# Patient Record
Sex: FEMALE | Race: WHITE | NOT HISPANIC OR LATINO | URBAN - METROPOLITAN AREA
[De-identification: names, ages, dates, MRNs, and addresses within clinical notes are randomized per-mention and may not be internally consistent; named-entity substitution may affect disease eponyms.]

---

## 2017-06-07 ENCOUNTER — EMERGENCY (EMERGENCY)
Facility: HOSPITAL | Age: 62
LOS: 0 days | Discharge: HOME | End: 2017-06-07

## 2017-06-07 DIAGNOSIS — I10 ESSENTIAL (PRIMARY) HYPERTENSION: ICD-10-CM

## 2017-06-07 DIAGNOSIS — K92.2 GASTROINTESTINAL HEMORRHAGE, UNSPECIFIED: ICD-10-CM

## 2017-06-07 DIAGNOSIS — E03.9 HYPOTHYROIDISM, UNSPECIFIED: ICD-10-CM

## 2017-06-28 DIAGNOSIS — R51 HEADACHE: ICD-10-CM

## 2017-06-28 DIAGNOSIS — E03.9 HYPOTHYROIDISM, UNSPECIFIED: ICD-10-CM

## 2017-06-28 DIAGNOSIS — I10 ESSENTIAL (PRIMARY) HYPERTENSION: ICD-10-CM

## 2017-06-28 DIAGNOSIS — E78.00 PURE HYPERCHOLESTEROLEMIA, UNSPECIFIED: ICD-10-CM

## 2018-06-05 ENCOUNTER — INPATIENT (INPATIENT)
Facility: HOSPITAL | Age: 63
LOS: 1 days | Discharge: HOME | End: 2018-06-07
Attending: INTERNAL MEDICINE | Admitting: INTERNAL MEDICINE

## 2018-06-05 VITALS
TEMPERATURE: 98 F | DIASTOLIC BLOOD PRESSURE: 61 MMHG | RESPIRATION RATE: 16 BRPM | SYSTOLIC BLOOD PRESSURE: 119 MMHG | HEART RATE: 120 BPM | OXYGEN SATURATION: 98 %

## 2018-06-05 DIAGNOSIS — Z98.890 OTHER SPECIFIED POSTPROCEDURAL STATES: Chronic | ICD-10-CM

## 2018-06-05 LAB
ALBUMIN SERPL ELPH-MCNC: 3.8 G/DL — SIGNIFICANT CHANGE UP (ref 3.5–5.2)
ALP SERPL-CCNC: 79 U/L — SIGNIFICANT CHANGE UP (ref 30–115)
ALT FLD-CCNC: 9 U/L — SIGNIFICANT CHANGE UP (ref 0–41)
ANION GAP SERPL CALC-SCNC: 14 MMOL/L — SIGNIFICANT CHANGE UP (ref 7–14)
ANION GAP SERPL CALC-SCNC: 17 MMOL/L — HIGH (ref 7–14)
APTT BLD: 28.8 SEC — SIGNIFICANT CHANGE UP (ref 27–39.2)
APTT BLD: 31.2 SEC — SIGNIFICANT CHANGE UP (ref 27–39.2)
AST SERPL-CCNC: 15 U/L — SIGNIFICANT CHANGE UP (ref 0–41)
BASOPHILS # BLD AUTO: 0.03 K/UL — SIGNIFICANT CHANGE UP (ref 0–0.2)
BASOPHILS NFR BLD AUTO: 0.2 % — SIGNIFICANT CHANGE UP (ref 0–1)
BILIRUB SERPL-MCNC: 0.2 MG/DL — SIGNIFICANT CHANGE UP (ref 0.2–1.2)
BUN SERPL-MCNC: 109 MG/DL — CRITICAL HIGH (ref 10–20)
BUN SERPL-MCNC: 99 MG/DL — CRITICAL HIGH (ref 10–20)
CALCIUM SERPL-MCNC: 8.3 MG/DL — LOW (ref 8.5–10.1)
CALCIUM SERPL-MCNC: 8.6 MG/DL — SIGNIFICANT CHANGE UP (ref 8.5–10.1)
CHLORIDE SERPL-SCNC: 105 MMOL/L — SIGNIFICANT CHANGE UP (ref 98–110)
CHLORIDE SERPL-SCNC: 111 MMOL/L — HIGH (ref 98–110)
CO2 SERPL-SCNC: 16 MMOL/L — LOW (ref 17–32)
CO2 SERPL-SCNC: 18 MMOL/L — SIGNIFICANT CHANGE UP (ref 17–32)
CREAT SERPL-MCNC: 2.1 MG/DL — HIGH (ref 0.7–1.5)
CREAT SERPL-MCNC: 2.5 MG/DL — HIGH (ref 0.7–1.5)
EOSINOPHIL # BLD AUTO: 0 K/UL — SIGNIFICANT CHANGE UP (ref 0–0.7)
EOSINOPHIL NFR BLD AUTO: 0 % — SIGNIFICANT CHANGE UP (ref 0–8)
GLUCOSE SERPL-MCNC: 103 MG/DL — HIGH (ref 70–99)
GLUCOSE SERPL-MCNC: 105 MG/DL — HIGH (ref 70–99)
HCT VFR BLD CALC: 24.6 % — LOW (ref 37–47)
HCT VFR BLD CALC: 28.4 % — LOW (ref 37–47)
HGB BLD-MCNC: 7.7 G/DL — LOW (ref 12–16)
HGB BLD-MCNC: 9 G/DL — LOW (ref 12–16)
IMM GRANULOCYTES NFR BLD AUTO: 0.7 % — HIGH (ref 0.1–0.3)
INR BLD: 1.15 RATIO — SIGNIFICANT CHANGE UP (ref 0.65–1.3)
INR BLD: 1.23 RATIO — SIGNIFICANT CHANGE UP (ref 0.65–1.3)
LACTATE SERPL-SCNC: 1.1 MMOL/L — SIGNIFICANT CHANGE UP (ref 0.5–2.2)
LYMPHOCYTES # BLD AUTO: 1.39 K/UL — SIGNIFICANT CHANGE UP (ref 1.2–3.4)
LYMPHOCYTES # BLD AUTO: 10.3 % — LOW (ref 20.5–51.1)
MCHC RBC-ENTMCNC: 29.3 PG — SIGNIFICANT CHANGE UP (ref 27–31)
MCHC RBC-ENTMCNC: 29.5 PG — SIGNIFICANT CHANGE UP (ref 27–31)
MCHC RBC-ENTMCNC: 31.3 G/DL — LOW (ref 32–37)
MCHC RBC-ENTMCNC: 31.7 G/DL — LOW (ref 32–37)
MCV RBC AUTO: 93.1 FL — SIGNIFICANT CHANGE UP (ref 81–99)
MCV RBC AUTO: 93.5 FL — SIGNIFICANT CHANGE UP (ref 81–99)
MONOCYTES # BLD AUTO: 0.46 K/UL — SIGNIFICANT CHANGE UP (ref 0.1–0.6)
MONOCYTES NFR BLD AUTO: 3.4 % — SIGNIFICANT CHANGE UP (ref 1.7–9.3)
NEUTROPHILS # BLD AUTO: 11.54 K/UL — HIGH (ref 1.4–6.5)
NEUTROPHILS NFR BLD AUTO: 85.4 % — HIGH (ref 42.2–75.2)
NRBC # BLD: 0 /100 WBCS — SIGNIFICANT CHANGE UP (ref 0–0)
PLATELET # BLD AUTO: 263 K/UL — SIGNIFICANT CHANGE UP (ref 130–400)
PLATELET # BLD AUTO: 339 K/UL — SIGNIFICANT CHANGE UP (ref 130–400)
POTASSIUM SERPL-MCNC: 5.6 MMOL/L — HIGH (ref 3.5–5)
POTASSIUM SERPL-MCNC: 5.8 MMOL/L — HIGH (ref 3.5–5)
POTASSIUM SERPL-SCNC: 5.6 MMOL/L — HIGH (ref 3.5–5)
POTASSIUM SERPL-SCNC: 5.8 MMOL/L — HIGH (ref 3.5–5)
PROT SERPL-MCNC: 6.2 G/DL — SIGNIFICANT CHANGE UP (ref 6–8)
PROTHROM AB SERPL-ACNC: 12.5 SEC — SIGNIFICANT CHANGE UP (ref 9.95–12.87)
PROTHROM AB SERPL-ACNC: 13.3 SEC — HIGH (ref 9.95–12.87)
RBC # BLD: 2.63 M/UL — LOW (ref 4.2–5.4)
RBC # BLD: 3.05 M/UL — LOW (ref 4.2–5.4)
RBC # FLD: 13.1 % — SIGNIFICANT CHANGE UP (ref 11.5–14.5)
RBC # FLD: 13.2 % — SIGNIFICANT CHANGE UP (ref 11.5–14.5)
SODIUM SERPL-SCNC: 140 MMOL/L — SIGNIFICANT CHANGE UP (ref 135–146)
SODIUM SERPL-SCNC: 141 MMOL/L — SIGNIFICANT CHANGE UP (ref 135–146)
TROPONIN T SERPL-MCNC: <0.01 NG/ML — SIGNIFICANT CHANGE UP
TYPE + AB SCN PNL BLD: SIGNIFICANT CHANGE UP
WBC # BLD: 10.34 K/UL — SIGNIFICANT CHANGE UP (ref 4.8–10.8)
WBC # BLD: 13.51 K/UL — HIGH (ref 4.8–10.8)
WBC # FLD AUTO: 10.34 K/UL — SIGNIFICANT CHANGE UP (ref 4.8–10.8)
WBC # FLD AUTO: 13.51 K/UL — HIGH (ref 4.8–10.8)

## 2018-06-05 RX ORDER — AMLODIPINE BESYLATE 2.5 MG/1
0 TABLET ORAL
Qty: 90 | Refills: 0 | COMMUNITY

## 2018-06-05 RX ORDER — LEVOTHYROXINE SODIUM 125 MCG
200 TABLET ORAL DAILY
Qty: 0 | Refills: 0 | Status: DISCONTINUED | OUTPATIENT
Start: 2018-06-05 | End: 2018-06-07

## 2018-06-05 RX ORDER — SODIUM CHLORIDE 9 MG/ML
1000 INJECTION, SOLUTION INTRAVENOUS ONCE
Qty: 0 | Refills: 0 | Status: COMPLETED | OUTPATIENT
Start: 2018-06-05 | End: 2018-06-05

## 2018-06-05 RX ORDER — INSULIN HUMAN 100 [IU]/ML
10 INJECTION, SOLUTION SUBCUTANEOUS ONCE
Qty: 0 | Refills: 0 | Status: DISCONTINUED | OUTPATIENT
Start: 2018-06-05 | End: 2018-06-05

## 2018-06-05 RX ORDER — DEXTROSE 50 % IN WATER 50 %
50 SYRINGE (ML) INTRAVENOUS ONCE
Qty: 0 | Refills: 0 | Status: COMPLETED | OUTPATIENT
Start: 2018-06-05 | End: 2018-06-05

## 2018-06-05 RX ORDER — LEVOTHYROXINE SODIUM 125 MCG
0 TABLET ORAL
Qty: 90 | Refills: 0 | COMMUNITY

## 2018-06-05 RX ORDER — INSULIN HUMAN 100 [IU]/ML
10 INJECTION, SOLUTION SUBCUTANEOUS ONCE
Qty: 0 | Refills: 0 | Status: COMPLETED | OUTPATIENT
Start: 2018-06-05 | End: 2018-06-05

## 2018-06-05 RX ORDER — PANTOPRAZOLE SODIUM 20 MG/1
8 TABLET, DELAYED RELEASE ORAL
Qty: 80 | Refills: 0 | Status: DISCONTINUED | OUTPATIENT
Start: 2018-06-05 | End: 2018-06-06

## 2018-06-05 RX ORDER — SODIUM CHLORIDE 9 MG/ML
1000 INJECTION, SOLUTION INTRAVENOUS
Qty: 0 | Refills: 0 | Status: DISCONTINUED | OUTPATIENT
Start: 2018-06-05 | End: 2018-06-06

## 2018-06-05 RX ORDER — ROSUVASTATIN CALCIUM 5 MG/1
0 TABLET ORAL
Qty: 90 | Refills: 0 | COMMUNITY

## 2018-06-05 RX ORDER — SODIUM CHLORIDE 9 MG/ML
1000 INJECTION INTRAMUSCULAR; INTRAVENOUS; SUBCUTANEOUS
Qty: 0 | Refills: 0 | Status: DISCONTINUED | OUTPATIENT
Start: 2018-06-05 | End: 2018-06-05

## 2018-06-05 RX ORDER — IRBESARTAN 75 MG/1
0 TABLET ORAL
Qty: 90 | Refills: 0 | COMMUNITY

## 2018-06-05 RX ORDER — PANTOPRAZOLE SODIUM 20 MG/1
40 TABLET, DELAYED RELEASE ORAL ONCE
Qty: 0 | Refills: 0 | Status: COMPLETED | OUTPATIENT
Start: 2018-06-05 | End: 2018-06-05

## 2018-06-05 RX ORDER — ATORVASTATIN CALCIUM 80 MG/1
40 TABLET, FILM COATED ORAL AT BEDTIME
Qty: 0 | Refills: 0 | Status: DISCONTINUED | OUTPATIENT
Start: 2018-06-05 | End: 2018-06-07

## 2018-06-05 RX ADMIN — INSULIN HUMAN 10 UNIT(S): 100 INJECTION, SOLUTION SUBCUTANEOUS at 10:32

## 2018-06-05 RX ADMIN — Medication 50 MILLILITER(S): at 10:33

## 2018-06-05 RX ADMIN — PANTOPRAZOLE SODIUM 10 MG/HR: 20 TABLET, DELAYED RELEASE ORAL at 15:42

## 2018-06-05 RX ADMIN — SODIUM CHLORIDE 75 MILLILITER(S): 9 INJECTION INTRAMUSCULAR; INTRAVENOUS; SUBCUTANEOUS at 18:30

## 2018-06-05 RX ADMIN — Medication 50 MILLILITER(S): at 23:32

## 2018-06-05 RX ADMIN — ATORVASTATIN CALCIUM 40 MILLIGRAM(S): 80 TABLET, FILM COATED ORAL at 23:31

## 2018-06-05 RX ADMIN — PANTOPRAZOLE SODIUM 10 MG/HR: 20 TABLET, DELAYED RELEASE ORAL at 18:30

## 2018-06-05 RX ADMIN — PANTOPRAZOLE SODIUM 40 MILLIGRAM(S): 20 TABLET, DELAYED RELEASE ORAL at 09:35

## 2018-06-05 RX ADMIN — SODIUM CHLORIDE 2000 MILLILITER(S): 9 INJECTION, SOLUTION INTRAVENOUS at 07:24

## 2018-06-05 RX ADMIN — SODIUM CHLORIDE 75 MILLILITER(S): 9 INJECTION INTRAMUSCULAR; INTRAVENOUS; SUBCUTANEOUS at 15:42

## 2018-06-05 NOTE — ED ADULT NURSE NOTE - OBJECTIVE STATEMENT
Patient, a&o x4 c/o of bloody stools. Patient states she feels SOB, lethargic, and has had a hx of GI hemorrhage in the past

## 2018-06-05 NOTE — ED PROVIDER NOTE - PROGRESS NOTE DETAILS
Pale, tachy, h/o bleeding ulcers. Ordered labs, T&S, NS, pantoprazole. Signed off care to Dr. CINDY Osman at this time who will f/u. pt feeling improved, VS improved, normotensive, not tachycardic- d/w Dr. Mcgrath- requesting pt NPO, 2UPRBC on hold for endo, will take to endo upon arrival dw Dr. Vera, approved for ICU GI fellow at bedside

## 2018-06-05 NOTE — CONSULT NOTE ADULT - SUBJECTIVE AND OBJECTIVE BOX
Patient is a 62y old  Female who presents with a chief complaint of melena.    HPI: 62 year old female with PMH of HTN and PUD presenting with melena and BRBPR.  She became dizzy and diaphoretic upon walking to the bathroom this morning. After that, she had 2 episodes of melena mixed with blood. She uses NSAIDs occasionally. She had a similar episode few years ago and was diagnosed with PUD. She denies any abdominal pain, nausea, vomiting, chest pain, or palpitations.      PAST MEDICAL & SURGICAL HISTORY:  Hypothyroid  GI hemorrhage  High cholesterol  CKD (chronic kidney disease)  HTN (hypertension)  H/O colonoscopy      SOCIAL HX:   Smoking   neg                   ETOH neg                        Other neg    FAMILY HISTORY:  Family history of breast cancer in mother (Mother)      ROS:  See HPI     Allergies  No Known Allergies      PHYSICAL EXAM    ICU Vital Signs Last 24 Hrs  T(C): 37 (05 Jun 2018 10:24), Max: 37 (05 Jun 2018 10:24)  T(F): 98.6 (05 Jun 2018 10:24), Max: 98.6 (05 Jun 2018 10:24)  HR: 84 (05 Jun 2018 10:24) (75 - 120)  BP: 140/87 (05 Jun 2018 10:24) (114/74 - 140/87)  RR: 20 (05 Jun 2018 10:24) (16 - 20)  SpO2: 98% (05 Jun 2018 10:24) (98% - 98%)      General: NAD  HEENT:  JORGE              Lymph Nodes: No cervical LN   Lungs: Bilateral BS, no crackles, no wheezing  Cardiovascular: Regular, grade 3 systolic murmur  Abdomen: Soft, Positive BS, ND/NT  Extremities: No clubbing, no LLE  Skin: Warm  Neurological: Non focal         LABS:                          9.0    10.34 )-----------( 339      ( 05 Jun 2018 06:51 )             28.4                                               06-05    140  |  105  |  109<HH>  ----------------------------<  105<H>  5.6<H>   |  18  |  2.5<H>    Ca    8.6      05 Jun 2018 06:51  Phos  3.6     06-05  Mg     2.3     06-05    TPro  6.2  /  Alb  3.8  /  TBili  0.2  /  DBili  x   /  AST  15  /  ALT  9   /  AlkPhos  79  06-05      PT/INR - ( 05 Jun 2018 06:51 )   PT: 13.30 sec;   INR: 1.23 ratio         PTT - ( 05 Jun 2018 06:51 )  PTT:28.8 sec                                           CARDIAC MARKERS ( 05 Jun 2018 06:51 )  x     / <0.01 ng/mL / x     / x     / x                                                LIVER FUNCTIONS - ( 05 Jun 2018 06:51 )  Alb: 3.8 g/dL / Pro: 6.2 g/dL / ALK PHOS: 79 U/L / ALT: 9 U/L / AST: 15 U/L / GGT: x                                                                                                                                       X-Rays               unremarkable                                                         MEDICATIONS  (STANDING):  atorvastatin 40 milliGRAM(s) Oral at bedtime  levothyroxine 200 MICROGram(s) Oral daily

## 2018-06-05 NOTE — ED ADULT NURSE NOTE - PMH
CKD (chronic kidney disease)    GI hemorrhage    High cholesterol    HTN (hypertension)    Hypothyroid

## 2018-06-05 NOTE — CONSULT NOTE ADULT - SUBJECTIVE AND OBJECTIVE BOX
Chief Complaint:  Patient is a 62y old  Female who presents with a chief complaint of     HPI:    Allergies:  No Known Allergies      Home Medications:    Hospital Medications:      PMHX/PSHX:  Hypothyroid  GI hemorrhage  High cholesterol  CKD (chronic kidney disease)  HTN (hypertension)      Family history:      Social History:     ROS:     General:  No wt loss, fevers, chills, night sweats, fatigue,   Eyes:  Good vision, no reported pain  ENT:  No sore throat, pain, runny nose, dysphagia  CV:  No pain, palpitations, hypo/hypertension  Resp:  No dyspnea, cough, tachypnea, wheezing  GI:  No pain, No nausea, No vomiting, No diarrhea, No constipation, No weight loss, No fever, No pruritis, No rectal bleeding, No tarry stools, No dysphagia,  :  No pain, bleeding, incontinence, nocturia  Muscle:  No pain, weakness  Neuro:  No weakness, tingling, memory problems  Psych:  No fatigue, insomnia, mood problems, depression  Endocrine:  No polyuria, polydipsia, cold/heat intolerance  Heme:  No petechiae, ecchymosis, easy bruisability  Skin:  No rash, tattoos, scars, edema      PHYSICAL EXAM:   Vital Signs:  Vital Signs Last 24 Hrs  T(C): 36.2 (05 Jun 2018 07:20), Max: 36.7 (05 Jun 2018 06:25)  T(F): 97.1 (05 Jun 2018 07:20), Max: 98 (05 Jun 2018 06:25)  HR: 75 (05 Jun 2018 07:20) (75 - 120)  BP: 114/74 (05 Jun 2018 07:20) (114/74 - 119/61)  BP(mean): --  RR: 16 (05 Jun 2018 07:20) (16 - 16)  SpO2: 98% (05 Jun 2018 07:20) (98% - 98%)  Daily     Daily     GENERAL:  Appears stated age, well-groomed, well-nourished, no distress  HEENT:  NC/AT,  conjunctivae clear and pink, no thyromegaly, nodules, adenopathy, no JVD, sclera -anicteric  CHEST:  Full & symmetric excursion, no increased effort, breath sounds clear  HEART:  Regular rhythm, S1, S2, no murmur/rub/S3/S4, no abdominal bruit, no edema  ABDOMEN:  Soft, non-tender, non-distended, normoactive bowel sounds,  no masses ,no hepato-splenomegaly, no signs of chronic liver disease  EXTEREMITIES:  no cyanosis,clubbing or edema  SKIN:  No rash/erythema/ecchymoses/petechiae/wounds/abscess/warm/dry  NEURO:  Alert, oriented, no asterixis, no tremor, no encephalopathy    LABS:                        9.0    10.34 )-----------( 339      ( 05 Jun 2018 06:51 )             28.4     06-05    140  |  105  |  109<HH>  ----------------------------<  105<H>  5.6<H>   |  18  |  2.5<H>    Ca    8.6      05 Jun 2018 06:51  Phos  3.6     06-05  Mg     2.3     06-05    TPro  6.2  /  Alb  3.8  /  TBili  0.2  /  DBili  x   /  AST  15  /  ALT  9   /  AlkPhos  79  06-05    LIVER FUNCTIONS - ( 05 Jun 2018 06:51 )  Alb: 3.8 g/dL / Pro: 6.2 g/dL / ALK PHOS: 79 U/L / ALT: 9 U/L / AST: 15 U/L / GGT: x           PT/INR - ( 05 Jun 2018 06:51 )   PT: 13.30 sec;   INR: 1.23 ratio         PTT - ( 05 Jun 2018 06:51 )  PTT:28.8 sec        Imaging: Chief Complaint:  Patient is a 62y old  Female who presents with a chief complaint of melena    HPI:  62 year old female with PMH of hypothyroidism, HLD, HTN, history of GI bleed came presenting with melena mixed with blood. patient reports that she woke up this morning and went to the bathroom but started to become dizzy and diaphoretic , she had 2 episodes of melena mixed with blood. patient reports taking NSAIDs occasionally. She had a similar episode few years ago. She denies any abdominal pain, nausea, vomiting or hematemesis.    Allergies:  No Known Allergies      Hospital Medications:      PMHX/PSHX:  Hypothyroid  GI hemorrhage  High cholesterol  CKD (chronic kidney disease)  HTN (hypertension)    ROS:   Eyes:  Good vision, no reported pain  ENT:  No sore throat, pain, runny nose, dysphagia  CV:  No pain, palpitations, hypo/hypertension  Resp:  No dyspnea, cough, tachypnea, wheezing  GI: see H&P  :  No pain, bleeding, incontinence, nocturia  Muscle:  No pain, weakness  Neuro:  No weakness, tingling, memory problems  Psych:  No fatigue, insomnia, mood problems, depression  Endocrine:  No polyuria, polydipsia, cold/heat intolerance  Heme:  No petechiae, ecchymosis, easy bruisability  Skin:  No rash, tattoos, scars, edema      PHYSICAL EXAM:   Vital Signs:  Vital Signs Last 24 Hrs  T(C): 36.2 (05 Jun 2018 07:20), Max: 36.7 (05 Jun 2018 06:25)  T(F): 97.1 (05 Jun 2018 07:20), Max: 98 (05 Jun 2018 06:25)  HR: 75 (05 Jun 2018 07:20) (75 - 120)  BP: 114/74 (05 Jun 2018 07:20) (114/74 - 119/61)  BP(mean): --  RR: 16 (05 Jun 2018 07:20) (16 - 16)  SpO2: 98% (05 Jun 2018 07:20) (98% - 98%)  Daily     Daily     GENERAL:  Appears stated age, well-groomed, well-nourished, no distress  HEENT:  NC/AT,  conjunctivae clear and pink, no thyromegaly, nodules, adenopathy, no JVD, sclera -anicteric  CHEST:  Full & symmetric excursion, no increased effort, breath sounds clear  HEART:  Regular rhythm, S1, S2, no murmur/rub/S3/S4, no abdominal bruit, no edema  ABDOMEN:  Soft, non-tender, non-distended, normoactive bowel sounds,  no masses ,no hepato-splenomegaly,  EXTEREMITIES:  no cyanosis,clubbing or edema  SKIN:  No rash/erythema/ecchymoses/petechiae/wounds/abscess/warm/dry  NEURO:  Alert, oriented, no asterixis, no tremor, no encephalopathy    LABS:                        9.0    10.34 )-----------( 339      ( 05 Jun 2018 06:51 )             28.4     06-05    140  |  105  |  109<HH>  ----------------------------<  105<H>  5.6<H>   |  18  |  2.5<H>    Ca    8.6      05 Jun 2018 06:51  Phos  3.6     06-05  Mg     2.3     06-05    TPro  6.2  /  Alb  3.8  /  TBili  0.2  /  DBili  x   /  AST  15  /  ALT  9   /  AlkPhos  79  06-05    LIVER FUNCTIONS - ( 05 Jun 2018 06:51 )  Alb: 3.8 g/dL / Pro: 6.2 g/dL / ALK PHOS: 79 U/L / ALT: 9 U/L / AST: 15 U/L / GGT: x           PT/INR - ( 05 Jun 2018 06:51 )   PT: 13.30 sec;   INR: 1.23 ratio         PTT - ( 05 Jun 2018 06:51 )  PTT:28.8 sec        Imaging:

## 2018-06-05 NOTE — ED PROVIDER NOTE - OBJECTIVE STATEMENT
62yoF with h/o CKD, HTN, HLD, hypothyroidism, multiple stomach ulcers requiring multiple transfusions and ICU stay p/w bloody stools. States awoke this morning with urge to defecate, had 2 black stools with bright red blood in the toilet. Associated generalized weakness with any movement, SOB, paleness, lightheadedness, diaphoresis.

## 2018-06-05 NOTE — PRE-ANESTHESIA EVALUATION ADULT - NSANTHADDINFOFT_GEN_ALL_CORE
61 y/o WF evaluated for urgent EGD for ongoing GI hemorrhage with anemia.  ASA 3E.  Plan IV sedation / GA backup.  Plan, benefits, foreseeable risks, viable alternatives discussed with patient and all her pertinent questions answered and she understands and elects to proceed.

## 2018-06-05 NOTE — H&P ADULT - ASSESSMENT
62Y F with pmh of gi bleeding 2/2 gastric ulcers in 2014/2015, htn, hypothyroidism, dld and CKD presents to the ED after two episodes of melena.    Acute GI bleeding/melena likely 2/2 peptic ulcers vs duodenal ulcer   -Admit to ICU  -2 18 guage IV   -NPO for now  -2 units prbc on hold for endoscopy   -protonix drip   -monitor H&H  -Start NS at 75ml/hr    Hyperkalemia of 5.6  -one dose of insulin and dextrose given  -monitor bmp     HTN  -pt was normotensive in ED but had symptoms of hypotension at home so will hold BP meds for now     DLD  -c/w atorvastatin    BHAVANA on CKD vs stable CKD   -last available creatinine baseline of 1.5 in 2014  -will start NS @75 and hold irbesartan     hypothyroidism  -c/w synthroid    Dispo: Will advance diet after endoscopy as per recommendations. Pt is full code from home.

## 2018-06-05 NOTE — CONSULT NOTE ADULT - ASSESSMENT
IMPRESSION:  UGIB secondary to PUD  Acute on chronic anemia  CKD  hyperkalemia      PLAN:    CNS: no depressants    HEENT: Oral care    PULMONARY:  HOB @ 45 degrees    CARDIOVASCULAR: I = O    GI: protonix gtt. EGD, NPO    RENAL:  Follow up lytes.  Correct as needed    INFECTIOUS DISEASE: none    HEMATOLOGICAL:  type and screen, transfuse if HB < 7 or for symptomatic anemia, serial CBC, SCD    ENDOCRINE:  Follow up FS.      MUSCULOSKELETAL: OOB to chair  Monitor in ICU IMPRESSION:  UGIB secondary to PUD  Acute on chronic anemia  CKD        PLAN:    CNS: no depressants    HEENT: Oral care    PULMONARY:  HOB @ 45 degrees    CARDIOVASCULAR: I = O. D5 1/2 NS 50 cc per hour    GI: Protonix gtt. EGD, NPO    RENAL:  Follow up lytes 6 pm .  Correct as needed    INFECTIOUS DISEASE: none    HEMATOLOGICAL:  type and screen, transfuse if HB < 7 or for symptomatic anemia, serial CBC, SCD    ENDOCRINE:  Follow up FS.      MUSCULOSKELETAL:     Monitor in ICU    SP EGD visible vessel

## 2018-06-05 NOTE — ED PROVIDER NOTE - PHYSICAL EXAMINATION
Afebrile, tachy, hemodynamically stable, saturating well  NAD, pale  Head NCAT  EOMI grossly, pale conjunctivae and mucosa  MMM  Tachy, reg rhythm, nml S1/S2, no m/r/g  Lungs CTAB, no w/r/r  Abd soft, NT, ND, nml BS, no rebound or guarding  AAO, CN's 3-12 grossly intact  Rect (CHRIS Hall as chaperone): black stool, guaiac positive  LINARES spontaneously, no leg cyanosis or edema  Skin warm, well perfused, no rashes or hives Afebrile, tachy, hemodynamically stable, saturating well  NAD, pale  Head NCAT  EOMI grossly, pale conjunctivae and mucosa  MMM  Tachy, reg rhythm, nml S1/S2, no m/r/g  Lungs CTAB, no w/r/r  Abd soft, NT, ND, nml BS, no rebound or guarding  AAO, CN's 3-12 grossly intact  Rect (CHRIS Hall as chaperone): black stool, guaiac positive  LINARES spontaneously, no leg cyanosis or edema  Skin warm, pale, no rashes or hives

## 2018-06-05 NOTE — H&P ADULT - NSHPLABSRESULTS_GEN_ALL_CORE
9.0    10.34 )-----------( 339      ( 05 Jun 2018 06:51 )             28.4       06-05    140  |  105  |  109<HH>  ----------------------------<  105<H>  5.6<H>   |  18  |  2.5<H>    Ca    8.6      05 Jun 2018 06:51  Phos  3.6     06-05  Mg     2.3     06-05    TPro  6.2  /  Alb  3.8  /  TBili  0.2  /  DBili  x   /  AST  15  /  ALT  9   /  AlkPhos  79  06-05          PT/INR - ( 05 Jun 2018 06:51 )   PT: 13.30 sec;   INR: 1.23 ratio         PTT - ( 05 Jun 2018 06:51 )  PTT:28.8 sec    Lactate Trend  06-05 @ 06:51 Lactate:1.1       CARDIAC MARKERS ( 05 Jun 2018 06:51 )  x     / <0.01 ng/mL / x     / x     / x

## 2018-06-05 NOTE — ED PROVIDER NOTE - MEDICAL DECISION MAKING DETAILS
pt with sx improving but +melena and weakness,   D/W Dr. Mcgrath, D/W Dr. Lancaster, will admit to ICU

## 2018-06-05 NOTE — ED ADULT NURSE REASSESSMENT NOTE - NS ED NURSE REASSESS COMMENT FT1
Pt comfortable at this time. IV protonix requested from pharmacy. Vitals are stable, awaiting dispo, pt reports last bloody BM early this AM. IVF infusing, will cont to monitor and assess.

## 2018-06-05 NOTE — PRE-ANESTHESIA EVALUATION ADULT - NSANTHOSAYNRD_GEN_A_CORE
No. AKUA screening performed.  STOP BANG Legend: 0-2 = LOW Risk; 3-4 = INTERMEDIATE Risk; 5-8 = HIGH Risk

## 2018-06-05 NOTE — H&P ADULT - NSHPPHYSICALEXAM_GEN_ALL_CORE
T(F): 98.6 (06-05-18 @ 10:24), Max: 98.6 (06-05-18 @ 10:24)  HR: 84 (06-05-18 @ 10:24) (75 - 120)  BP: 140/87 (06-05-18 @ 10:24) (114/74 - 140/87)  RR: 20 (06-05-18 @ 10:24) (16 - 20)  SpO2: 98% (06-05-18 @ 10:24) (98% - 98%)  PHYSICAL EXAM:  GENERAL: NAD, speaks in full sentences, no signs of respiratory distress  HEAD:  Atraumatic, Normocephalic  EYES: EOMI, conjunctiva and sclera clear  CHEST/LUNG: Clear to auscultation bilaterally; No wheeze; No crackles; No accessory muscles used  HEART: Regular rate and rhythm; systolic murmur   ABDOMEN: Soft, Nontender, Nondistended; Bowel sounds present; No guarding  EXTREMITIES:  2+ Peripheral Pulses, No cyanosis or edema  PSYCH: AAOx3  NEUROLOGY: non-focal  SKIN: No rashes or lesions

## 2018-06-05 NOTE — H&P ADULT - HISTORY OF PRESENT ILLNESS
62Y F with pmh of gi bleeding 2/2 gastric ulcers in 2014/2015, htn, hypothyroidism, dld and CKD presents to the ED after two episodes of melena. As per patient she woke up at 4AM and had to go to the bathroom due to the urge to pass stool. She noticed that the stool was black mixed with some bright red blood. She had another episode at 5am after which she started feeling sob, lightheaded and sweaty and was brought to the ED by her . The patient has no abdominal pain but says she has been under a lot of stress lately. The last episode of GI bleeding occurred around the end of 2014 and the beginning of 2015. As per patient she had 9 bleeding ulcers and was on ppi for about 3 months after which it was stopped. In the ED she was tachycardic to 120s with a stable blood pressure. Her last colonoscopy was 2 yrs ago and her endoscopy was around the 2015. She had an intentional weight loss of 27 pounds. No other complaints.

## 2018-06-05 NOTE — H&P ADULT - NSHPSOCIALHISTORY_GEN_ALL_CORE
Marital Status:  (x )    (   ) Single    (   )    (  )   Occupation: retired  Lives with: (  ) alone  (  ) children   (x ) spouse   (  ) parents  (  ) other    Substance Use (street drugs): (x  ) never used  (  ) other:  Tobacco Usage:  (   ) never smoked   ( quit 25yrs ago) former smoker   (   ) current smoker  (     ) pack year  (        ) last cigarette date  Alcohol Usage: occasional

## 2018-06-05 NOTE — CONSULT NOTE ADULT - ASSESSMENT
62 year old female with PMH of hypothyroidism, HLD, HTN, history of GI bleed came presenting with melena mixed with blood.   Melena secondary to UGI bleed:  patient has history of gastric ulcers  Plan:  2 wide bore IV access  monitor H&H  transfuse appropriately  Protonix drip  NPO for now  will arrange for EGD today

## 2018-06-06 LAB
ALBUMIN SERPL ELPH-MCNC: 3.7 G/DL — SIGNIFICANT CHANGE UP (ref 3.5–5.2)
ALP SERPL-CCNC: 70 U/L — SIGNIFICANT CHANGE UP (ref 30–115)
ALT FLD-CCNC: 9 U/L — SIGNIFICANT CHANGE UP (ref 0–41)
ANION GAP SERPL CALC-SCNC: 12 MMOL/L — SIGNIFICANT CHANGE UP (ref 7–14)
ANION GAP SERPL CALC-SCNC: 13 MMOL/L — SIGNIFICANT CHANGE UP (ref 7–14)
APPEARANCE UR: CLEAR — SIGNIFICANT CHANGE UP
AST SERPL-CCNC: 13 U/L — SIGNIFICANT CHANGE UP (ref 0–41)
BACTERIA # UR AUTO: (no result) /HPF
BASOPHILS # BLD AUTO: 0.04 K/UL — SIGNIFICANT CHANGE UP (ref 0–0.2)
BASOPHILS NFR BLD AUTO: 0.3 % — SIGNIFICANT CHANGE UP (ref 0–1)
BILIRUB SERPL-MCNC: 0.5 MG/DL — SIGNIFICANT CHANGE UP (ref 0.2–1.2)
BILIRUB UR-MCNC: NEGATIVE — SIGNIFICANT CHANGE UP
BUN SERPL-MCNC: 80 MG/DL — CRITICAL HIGH (ref 10–20)
BUN SERPL-MCNC: 92 MG/DL — CRITICAL HIGH (ref 10–20)
CALCIUM SERPL-MCNC: 8.3 MG/DL — LOW (ref 8.5–10.1)
CALCIUM SERPL-MCNC: 8.6 MG/DL — SIGNIFICANT CHANGE UP (ref 8.5–10.1)
CHLORIDE SERPL-SCNC: 110 MMOL/L — SIGNIFICANT CHANGE UP (ref 98–110)
CHLORIDE SERPL-SCNC: 110 MMOL/L — SIGNIFICANT CHANGE UP (ref 98–110)
CO2 SERPL-SCNC: 17 MMOL/L — SIGNIFICANT CHANGE UP (ref 17–32)
CO2 SERPL-SCNC: 20 MMOL/L — SIGNIFICANT CHANGE UP (ref 17–32)
COLOR SPEC: YELLOW — SIGNIFICANT CHANGE UP
CREAT SERPL-MCNC: 2.1 MG/DL — HIGH (ref 0.7–1.5)
CREAT SERPL-MCNC: 2.1 MG/DL — HIGH (ref 0.7–1.5)
DIFF PNL FLD: NEGATIVE — SIGNIFICANT CHANGE UP
EOSINOPHIL # BLD AUTO: 0.09 K/UL — SIGNIFICANT CHANGE UP (ref 0–0.7)
EOSINOPHIL NFR BLD AUTO: 0.7 % — SIGNIFICANT CHANGE UP (ref 0–8)
EPI CELLS # UR: (no result) /HPF
GLUCOSE SERPL-MCNC: 118 MG/DL — HIGH (ref 70–99)
GLUCOSE SERPL-MCNC: 93 MG/DL — SIGNIFICANT CHANGE UP (ref 70–99)
GLUCOSE UR QL: NEGATIVE MG/DL — SIGNIFICANT CHANGE UP
HCT VFR BLD CALC: 22.2 % — LOW (ref 37–47)
HCT VFR BLD CALC: 25.9 % — LOW (ref 37–47)
HCT VFR BLD CALC: 27.2 % — LOW (ref 37–47)
HGB BLD-MCNC: 7 G/DL — CRITICAL LOW (ref 12–16)
HGB BLD-MCNC: 8.1 G/DL — LOW (ref 12–16)
HGB BLD-MCNC: 8.5 G/DL — LOW (ref 12–16)
IMM GRANULOCYTES NFR BLD AUTO: 0.6 % — HIGH (ref 0.1–0.3)
KETONES UR-MCNC: NEGATIVE — SIGNIFICANT CHANGE UP
LEUKOCYTE ESTERASE UR-ACNC: (no result)
LYMPHOCYTES # BLD AUTO: 15.4 % — LOW (ref 20.5–51.1)
LYMPHOCYTES # BLD AUTO: 2.05 K/UL — SIGNIFICANT CHANGE UP (ref 1.2–3.4)
MAGNESIUM SERPL-MCNC: 2.2 MG/DL — SIGNIFICANT CHANGE UP (ref 1.8–2.4)
MCHC RBC-ENTMCNC: 29.5 PG — SIGNIFICANT CHANGE UP (ref 27–31)
MCHC RBC-ENTMCNC: 29.7 PG — SIGNIFICANT CHANGE UP (ref 27–31)
MCHC RBC-ENTMCNC: 29.7 PG — SIGNIFICANT CHANGE UP (ref 27–31)
MCHC RBC-ENTMCNC: 31.3 G/DL — LOW (ref 32–37)
MCHC RBC-ENTMCNC: 31.3 G/DL — LOW (ref 32–37)
MCHC RBC-ENTMCNC: 31.5 G/DL — LOW (ref 32–37)
MCV RBC AUTO: 94.1 FL — SIGNIFICANT CHANGE UP (ref 81–99)
MCV RBC AUTO: 94.4 FL — SIGNIFICANT CHANGE UP (ref 81–99)
MCV RBC AUTO: 94.9 FL — SIGNIFICANT CHANGE UP (ref 81–99)
MONOCYTES # BLD AUTO: 0.88 K/UL — HIGH (ref 0.1–0.6)
MONOCYTES NFR BLD AUTO: 6.6 % — SIGNIFICANT CHANGE UP (ref 1.7–9.3)
NEUTROPHILS # BLD AUTO: 10.13 K/UL — HIGH (ref 1.4–6.5)
NEUTROPHILS NFR BLD AUTO: 76.4 % — HIGH (ref 42.2–75.2)
NITRITE UR-MCNC: NEGATIVE — SIGNIFICANT CHANGE UP
NRBC # BLD: 0 /100 WBCS — SIGNIFICANT CHANGE UP (ref 0–0)
PH UR: 6 — SIGNIFICANT CHANGE UP (ref 5–8)
PLATELET # BLD AUTO: 241 K/UL — SIGNIFICANT CHANGE UP (ref 130–400)
PLATELET # BLD AUTO: 251 K/UL — SIGNIFICANT CHANGE UP (ref 130–400)
PLATELET # BLD AUTO: 281 K/UL — SIGNIFICANT CHANGE UP (ref 130–400)
POTASSIUM SERPL-MCNC: 4.7 MMOL/L — SIGNIFICANT CHANGE UP (ref 3.5–5)
POTASSIUM SERPL-MCNC: 5.7 MMOL/L — HIGH (ref 3.5–5)
POTASSIUM SERPL-SCNC: 4.7 MMOL/L — SIGNIFICANT CHANGE UP (ref 3.5–5)
POTASSIUM SERPL-SCNC: 5.7 MMOL/L — HIGH (ref 3.5–5)
PROT SERPL-MCNC: 5.9 G/DL — LOW (ref 6–8)
PROT UR-MCNC: (no result) MG/DL
RBC # BLD: 2.36 M/UL — LOW (ref 4.2–5.4)
RBC # BLD: 2.73 M/UL — LOW (ref 4.2–5.4)
RBC # BLD: 2.88 M/UL — LOW (ref 4.2–5.4)
RBC # FLD: 13.5 % — SIGNIFICANT CHANGE UP (ref 11.5–14.5)
RBC # FLD: 13.7 % — SIGNIFICANT CHANGE UP (ref 11.5–14.5)
RBC # FLD: 13.8 % — SIGNIFICANT CHANGE UP (ref 11.5–14.5)
SODIUM SERPL-SCNC: 140 MMOL/L — SIGNIFICANT CHANGE UP (ref 135–146)
SODIUM SERPL-SCNC: 142 MMOL/L — SIGNIFICANT CHANGE UP (ref 135–146)
SP GR SPEC: 1.01 — SIGNIFICANT CHANGE UP (ref 1.01–1.03)
UROBILINOGEN FLD QL: 0.2 MG/DL — SIGNIFICANT CHANGE UP (ref 0.2–0.2)
WBC # BLD: 12.36 K/UL — HIGH (ref 4.8–10.8)
WBC # BLD: 13.27 K/UL — HIGH (ref 4.8–10.8)
WBC # BLD: 16.65 K/UL — HIGH (ref 4.8–10.8)
WBC # FLD AUTO: 12.36 K/UL — HIGH (ref 4.8–10.8)
WBC # FLD AUTO: 13.27 K/UL — HIGH (ref 4.8–10.8)
WBC # FLD AUTO: 16.65 K/UL — HIGH (ref 4.8–10.8)
WBC UR QL: (no result) /HPF

## 2018-06-06 RX ORDER — PANTOPRAZOLE SODIUM 20 MG/1
40 TABLET, DELAYED RELEASE ORAL EVERY 12 HOURS
Qty: 0 | Refills: 0 | Status: DISCONTINUED | OUTPATIENT
Start: 2018-06-06 | End: 2018-06-07

## 2018-06-06 RX ORDER — SODIUM POLYSTYRENE SULFONATE 4.1 MEQ/G
30 POWDER, FOR SUSPENSION ORAL ONCE
Qty: 0 | Refills: 0 | Status: COMPLETED | OUTPATIENT
Start: 2018-06-06 | End: 2018-06-06

## 2018-06-06 RX ORDER — INSULIN HUMAN 100 [IU]/ML
10 INJECTION, SOLUTION SUBCUTANEOUS ONCE
Qty: 0 | Refills: 0 | Status: COMPLETED | OUTPATIENT
Start: 2018-06-06 | End: 2018-06-06

## 2018-06-06 RX ORDER — DEXTROSE 50 % IN WATER 50 %
50 SYRINGE (ML) INTRAVENOUS ONCE
Qty: 0 | Refills: 0 | Status: COMPLETED | OUTPATIENT
Start: 2018-06-06 | End: 2018-06-06

## 2018-06-06 RX ADMIN — Medication 50 MILLILITER(S): at 14:14

## 2018-06-06 RX ADMIN — Medication 200 MICROGRAM(S): at 07:28

## 2018-06-06 RX ADMIN — ATORVASTATIN CALCIUM 40 MILLIGRAM(S): 80 TABLET, FILM COATED ORAL at 22:15

## 2018-06-06 RX ADMIN — SODIUM POLYSTYRENE SULFONATE 30 GRAM(S): 4.1 POWDER, FOR SUSPENSION ORAL at 14:14

## 2018-06-06 RX ADMIN — SODIUM CHLORIDE 50 MILLILITER(S): 9 INJECTION, SOLUTION INTRAVENOUS at 07:00

## 2018-06-06 RX ADMIN — PANTOPRAZOLE SODIUM 10 MG/HR: 20 TABLET, DELAYED RELEASE ORAL at 07:00

## 2018-06-06 RX ADMIN — INSULIN HUMAN 10 UNIT(S): 100 INJECTION, SOLUTION SUBCUTANEOUS at 14:15

## 2018-06-06 RX ADMIN — PANTOPRAZOLE SODIUM 40 MILLIGRAM(S): 20 TABLET, DELAYED RELEASE ORAL at 18:20

## 2018-06-06 NOTE — PROGRESS NOTE ADULT - SUBJECTIVE AND OBJECTIVE BOX
Over Night Events:m  DID well overnight.  No SOB.  no Abdominal pain        ROS:  See HPI    PHYSICAL EXAM    ICU Vital Signs Last 24 Hrs  T(C): 37.5 (06 Jun 2018 04:00), Max: 37.5 (06 Jun 2018 04:00)  T(F): 99.5 (06 Jun 2018 04:00), Max: 99.5 (06 Jun 2018 04:00)  HR: 72 (06 Jun 2018 06:00) (72 - 100)  BP: 112/58 (06 Jun 2018 06:00) (98/50 - 144/76)  BP(mean): 84 (06 Jun 2018 06:00) (64 - 140)  ABP: --  ABP(mean): --  RR: 17 (06 Jun 2018 06:00) (10 - 33)  SpO2: 97% (06 Jun 2018 06:00) (95% - 100%)      General: in NAD   HEENT: JORGE             Lymph Nodes: No cervical LN   Lungs: Bilateral BS  Cardiovascular: Regular   Abdomen: Soft, Positive BS  Extremities: No clubbing   Skin: Warm  Neurological: Non focal       06-05-18 @ 07:01  -  06-06-18 @ 07:00  --------------------------------------------------------  IN:    dextrose 5% + sodium chloride 0.45%.: 450 mL    pantoprazole Infusion: 120 mL    sodium chloride 0.9%: 225 mL  Total IN: 795 mL    OUT:    Voided: 1250 mL  Total OUT: 1250 mL    Total NET: -455 mL          LABS:                            7.0    16.65 )-----------( 241      ( 06 Jun 2018 00:42 )             22.2                                               06-06    140  |  110  |  92<HH>  ----------------------------<  93  4.7   |  17  |  2.1<H>        Ca    8.3<L>      06 Jun 2018 00:42  Phos  3.6     06-05  Mg     2.3     06-05    TPro  6.2  /  Alb  3.8  /  TBili  0.2  /  DBili  x   /  AST  15  /  ALT  9   /  AlkPhos  79  06-05      PT/INR - ( 05 Jun 2018 16:40 )   PT: 12.50 sec;   INR: 1.15 ratio         PTT - ( 05 Jun 2018 16:40 )  PTT:31.2 sec                                           CARDIAC MARKERS ( 05 Jun 2018 06:51 )  x     / <0.01 ng/mL / x     / x     / x                                                LIVER FUNCTIONS - ( 05 Jun 2018 06:51 )  Alb: 3.8 g/dL / Pro: 6.2 g/dL / ALK PHOS: 79 U/L / ALT: 9 U/L / AST: 15 U/L / GGT: x                                                                                                                                       MEDICATIONS  (STANDING):  atorvastatin 40 milliGRAM(s) Oral at bedtime  dextrose 5% + sodium chloride 0.45%. 1000 milliLiter(s) (50 mL/Hr) IV Continuous <Continuous>  levothyroxine 200 MICROGram(s) Oral daily  pantoprazole Infusion 8 mG/Hr (10 mL/Hr) IV Continuous <Continuous>    MEDICATIONS  (PRN):      Xrays:                                                                                     ECHO

## 2018-06-06 NOTE — CHART NOTE - NSCHARTNOTEFT_GEN_A_CORE
ICU DOWNGRADE NOTE:    62y Female transferred to floor from ICU    Patient is a 62y old Female who presents with a chief complaint of Acute GI bleeding (05 Jun 2018 09:58)    The patient is currently admitted for the primary diagnosis of GI bleed    The patient was admitted to the unit for 1 Day.    Indwelling vascular catheters: peripheral IV    Urinary Catheter: n/a    Disposition: stable for downgrade to floor     Code Status: Full code    Brief Hx:  62Y F with pmh of gi bleeding 2/2 gastric ulcers in 2014/2015, htn, hypothyroidism, dld and CKD presents to the ED after two episodes of melena.  s/p EGD 6/5:mild gastritis in atrum. Duodenitis in bulb, 1.5cm ulcer at posterior duodenal wall with visible vessel s/p epi, cauterization, 2 endoclips    ICU COURSE OF EVENTS:  -------------------------------------------------------------------------------------------  IMPRESSION: #UGIB 2/2 PUD- s/p EGD with EPI/cautery/clipping, #Acute on chronic anemia, #BHAVANA on CKD- improving, #HTN, #Hyperkalemia, #Hypothyroid    PLAN  NEURO:   -no sedation    PULMONARY:   -HOB @ 45 degrees    CARDIOVASCULAR: #HTN  -keep I=O  -d/c IVFs  -monitor BP c/w holding home meds     GASTROENTEROLOGY: #UGIB 2/2 PUD- s/p EGD with EPI/cautery/clipping  -tolerating clear liquid diet, advance diet in evening  -d/w GI (Dr Mcgrath) if stable likely d/c tomorrow    INFECTIOUS DISEASES:   -afebrile  -WBCs elevated- reactive vs UTI?  -pt asymptomatic  -no Abx at this time    HEMATOLOGY/ONCOLOGY: #Acute on chronic anemia  -s/p 1 unit PRBCs this morning 6/6/18  -repeat Hgb 8.5  -monitor Hgb    GENITOURINARY/RENAL: #BHAVANA on CKD- improving, #HTN, #Hyperkalemia  #pt follows with renal as outpatient  -baseline Cr 2014 1.5 unknown current  -improving    #pt states has been hyperkalemic at home as well  -possibly 2/2 CKD vs Ibsartan?  -10 units insulin/D50/30 grams kayaxalate x1  -repeat BMP      ENDOCRINE: #Hypothyroid  -c/w levothyroxine    MUSCULOSKELETAL:   -Activity: OOBTC        -------------------------------------------------------------------------------------------    Current workup in progress:  -f/u 8pm CBC (Hgb), BMP (k+)  -f/u GI recs tomorrow ICU DOWNGRADE NOTE:    62y Female transferred to floor from ICU    Patient is a 62y old Female who presents with a chief complaint of Acute GI bleeding (05 Jun 2018 09:58)    The patient is currently admitted for the primary diagnosis of GI bleed    The patient was admitted to the unit for 1 Day.    Indwelling vascular catheters: peripheral IV    Urinary Catheter: n/a    Disposition: stable for downgrade to floor     Code Status: Full code    Brief Hx:  62Y F with pmh of gi bleeding 2/2 gastric ulcers in 2014/2015, htn, hypothyroidism, dld and CKD presents to the ED after two episodes of melena.  s/p EGD 6/5:mild gastritis in atrum. Duodenitis in bulb, 1.5cm ulcer at posterior duodenal wall with visible vessel s/p epi, cauterization, 2 endoclips    ICU COURSE OF EVENTS:  -------------------------------------------------------------------------------------------  IMPRESSION: #UGIB 2/2 PUD- s/p EGD with EPI/cautery/clipping, #Acute on chronic anemia, #BHAVANA on CKD- improving, #HTN, #Hyperkalemia, #Hypothyroid    PLAN  NEURO:   -no sedation    PULMONARY:   -HOB @ 45 degrees    CARDIOVASCULAR: #HTN  -keep I=O  -d/c IVFs  -monitor BP c/w holding home meds     GASTROENTEROLOGY: #UGIB 2/2 PUD- s/p EGD with EPI/cautery/clipping  -tolerating clear liquid diet, advance diet in evening  -d/w GI (Dr Mcgrath) if stable likely d/c tomorrow    INFECTIOUS DISEASES:   -afebrile  -WBCs elevated- reactive vs UTI?  -pt asymptomatic  -Bcx pending  -no Abx at this time    HEMATOLOGY/ONCOLOGY: #Acute on chronic anemia  -s/p 1 unit PRBCs this morning 6/6/18  -repeat Hgb 8.5  -monitor Hgb    GENITOURINARY/RENAL: #BHAVANA on CKD- improving, #HTN, #Hyperkalemia  #pt follows with renal as outpatient  -baseline Cr 2014 1.5 unknown current  -improving    #pt states has been hyperkalemic at home as well  -possibly 2/2 CKD vs Ibsartan?  -10 units insulin/D50/30 grams kayaxalate x1  -repeat BMP      ENDOCRINE: #Hypothyroid  -c/w levothyroxine    MUSCULOSKELETAL:   -Activity: OOBTC        -------------------------------------------------------------------------------------------    Current workup in progress:  -f/u 8pm CBC (Hgb), BMP (k+)  -f/u GI recs tomorrow

## 2018-06-07 ENCOUNTER — TRANSCRIPTION ENCOUNTER (OUTPATIENT)
Age: 63
End: 2018-06-07

## 2018-06-07 VITALS
SYSTOLIC BLOOD PRESSURE: 133 MMHG | TEMPERATURE: 99 F | RESPIRATION RATE: 18 BRPM | DIASTOLIC BLOOD PRESSURE: 64 MMHG | HEART RATE: 84 BPM

## 2018-06-07 LAB
ANION GAP SERPL CALC-SCNC: 12 MMOL/L — SIGNIFICANT CHANGE UP (ref 7–14)
ANION GAP SERPL CALC-SCNC: 15 MMOL/L — HIGH (ref 7–14)
BASOPHILS # BLD AUTO: 0.04 K/UL — SIGNIFICANT CHANGE UP (ref 0–0.2)
BASOPHILS NFR BLD AUTO: 0.4 % — SIGNIFICANT CHANGE UP (ref 0–1)
BUN SERPL-MCNC: 61 MG/DL — CRITICAL HIGH (ref 10–20)
BUN SERPL-MCNC: 69 MG/DL — CRITICAL HIGH (ref 10–20)
CALCIUM SERPL-MCNC: 8.3 MG/DL — LOW (ref 8.5–10.1)
CALCIUM SERPL-MCNC: 8.4 MG/DL — LOW (ref 8.5–10.1)
CHLORIDE SERPL-SCNC: 108 MMOL/L — SIGNIFICANT CHANGE UP (ref 98–110)
CHLORIDE SERPL-SCNC: 112 MMOL/L — HIGH (ref 98–110)
CO2 SERPL-SCNC: 21 MMOL/L — SIGNIFICANT CHANGE UP (ref 17–32)
CO2 SERPL-SCNC: 23 MMOL/L — SIGNIFICANT CHANGE UP (ref 17–32)
CREAT SERPL-MCNC: 2.1 MG/DL — HIGH (ref 0.7–1.5)
CREAT SERPL-MCNC: 2.3 MG/DL — HIGH (ref 0.7–1.5)
EOSINOPHIL # BLD AUTO: 0.21 K/UL — SIGNIFICANT CHANGE UP (ref 0–0.7)
EOSINOPHIL NFR BLD AUTO: 2.3 % — SIGNIFICANT CHANGE UP (ref 0–8)
GLUCOSE SERPL-MCNC: 116 MG/DL — HIGH (ref 70–99)
GLUCOSE SERPL-MCNC: 99 MG/DL — SIGNIFICANT CHANGE UP (ref 70–99)
HCT VFR BLD CALC: 24.5 % — LOW (ref 37–47)
HCT VFR BLD CALC: 26.6 % — LOW (ref 37–47)
HGB BLD-MCNC: 7.7 G/DL — LOW (ref 12–16)
HGB BLD-MCNC: 8.4 G/DL — LOW (ref 12–16)
IMM GRANULOCYTES NFR BLD AUTO: 0.3 % — SIGNIFICANT CHANGE UP (ref 0.1–0.3)
LYMPHOCYTES # BLD AUTO: 1.76 K/UL — SIGNIFICANT CHANGE UP (ref 1.2–3.4)
LYMPHOCYTES # BLD AUTO: 19.2 % — LOW (ref 20.5–51.1)
MAGNESIUM SERPL-MCNC: 2 MG/DL — SIGNIFICANT CHANGE UP (ref 1.8–2.4)
MCHC RBC-ENTMCNC: 29.7 PG — SIGNIFICANT CHANGE UP (ref 27–31)
MCHC RBC-ENTMCNC: 30.1 PG — SIGNIFICANT CHANGE UP (ref 27–31)
MCHC RBC-ENTMCNC: 31.4 G/DL — LOW (ref 32–37)
MCHC RBC-ENTMCNC: 31.6 G/DL — LOW (ref 32–37)
MCV RBC AUTO: 94.6 FL — SIGNIFICANT CHANGE UP (ref 81–99)
MCV RBC AUTO: 95.3 FL — SIGNIFICANT CHANGE UP (ref 81–99)
MONOCYTES # BLD AUTO: 0.75 K/UL — HIGH (ref 0.1–0.6)
MONOCYTES NFR BLD AUTO: 8.2 % — SIGNIFICANT CHANGE UP (ref 1.7–9.3)
NEUTROPHILS # BLD AUTO: 6.4 K/UL — SIGNIFICANT CHANGE UP (ref 1.4–6.5)
NEUTROPHILS NFR BLD AUTO: 69.6 % — SIGNIFICANT CHANGE UP (ref 42.2–75.2)
NRBC # BLD: 0 /100 WBCS — SIGNIFICANT CHANGE UP (ref 0–0)
NRBC # BLD: 0 /100 WBCS — SIGNIFICANT CHANGE UP (ref 0–0)
PLATELET # BLD AUTO: 222 K/UL — SIGNIFICANT CHANGE UP (ref 130–400)
PLATELET # BLD AUTO: 262 K/UL — SIGNIFICANT CHANGE UP (ref 130–400)
POTASSIUM SERPL-MCNC: 4.7 MMOL/L — SIGNIFICANT CHANGE UP (ref 3.5–5)
POTASSIUM SERPL-MCNC: 4.8 MMOL/L — SIGNIFICANT CHANGE UP (ref 3.5–5)
POTASSIUM SERPL-SCNC: 4.7 MMOL/L — SIGNIFICANT CHANGE UP (ref 3.5–5)
POTASSIUM SERPL-SCNC: 4.8 MMOL/L — SIGNIFICANT CHANGE UP (ref 3.5–5)
RBC # BLD: 2.59 M/UL — LOW (ref 4.2–5.4)
RBC # BLD: 2.79 M/UL — LOW (ref 4.2–5.4)
RBC # FLD: 13.6 % — SIGNIFICANT CHANGE UP (ref 11.5–14.5)
RBC # FLD: 13.7 % — SIGNIFICANT CHANGE UP (ref 11.5–14.5)
SODIUM SERPL-SCNC: 144 MMOL/L — SIGNIFICANT CHANGE UP (ref 135–146)
SODIUM SERPL-SCNC: 147 MMOL/L — HIGH (ref 135–146)
WBC # BLD: 10.39 K/UL — SIGNIFICANT CHANGE UP (ref 4.8–10.8)
WBC # BLD: 9.19 K/UL — SIGNIFICANT CHANGE UP (ref 4.8–10.8)
WBC # FLD AUTO: 10.39 K/UL — SIGNIFICANT CHANGE UP (ref 4.8–10.8)
WBC # FLD AUTO: 9.19 K/UL — SIGNIFICANT CHANGE UP (ref 4.8–10.8)

## 2018-06-07 RX ORDER — FERROUS SULFATE 325(65) MG
1 TABLET ORAL
Qty: 30 | Refills: 0 | OUTPATIENT
Start: 2018-06-07 | End: 2018-07-06

## 2018-06-07 RX ORDER — DOCUSATE SODIUM 100 MG
100 CAPSULE ORAL
Qty: 0 | Refills: 0 | Status: DISCONTINUED | OUTPATIENT
Start: 2018-06-07 | End: 2018-06-07

## 2018-06-07 RX ORDER — FERROUS SULFATE 325(65) MG
325 TABLET ORAL DAILY
Qty: 0 | Refills: 0 | Status: DISCONTINUED | OUTPATIENT
Start: 2018-06-07 | End: 2018-06-07

## 2018-06-07 RX ORDER — PANTOPRAZOLE SODIUM 20 MG/1
1 TABLET, DELAYED RELEASE ORAL
Qty: 60 | Refills: 3 | OUTPATIENT
Start: 2018-06-07 | End: 2018-10-04

## 2018-06-07 RX ORDER — PANTOPRAZOLE SODIUM 20 MG/1
40 TABLET, DELAYED RELEASE ORAL
Qty: 0 | Refills: 0 | Status: DISCONTINUED | OUTPATIENT
Start: 2018-06-07 | End: 2018-06-07

## 2018-06-07 RX ORDER — DOCUSATE SODIUM 100 MG
1 CAPSULE ORAL
Qty: 60 | Refills: 0 | OUTPATIENT
Start: 2018-06-07 | End: 2018-07-06

## 2018-06-07 RX ADMIN — Medication 1 TABLET(S): at 12:54

## 2018-06-07 RX ADMIN — PANTOPRAZOLE SODIUM 40 MILLIGRAM(S): 20 TABLET, DELAYED RELEASE ORAL at 06:30

## 2018-06-07 RX ADMIN — Medication 200 MICROGRAM(S): at 06:30

## 2018-06-07 RX ADMIN — Medication 325 MILLIGRAM(S): at 12:54

## 2018-06-07 NOTE — DISCHARGE NOTE ADULT - HOSPITAL COURSE
63 yo F with pmh of GI bleeding 2/2 gastric ulcers in 2014/2015, htn, hypothyroidism, dld and CKD presents to the ED after two episodes of melena.   Pt has a history of GI bleed with the last episode of GI bleeding occurring around the end of 2014 and the beginning of 2015. As per patient she had 9 bleeding ulcers and was on ppi for about 3 months after which it was stopped. Patient reports taking NSAIDs occasionally for headache. Patient also reports that she was dx and treated with H.pylori in the past. Pt was admitted to the ICU. She was hemodynamically stable.  Pt had a EGD on 6/5 that showed mild gastritis in atrum. Duodenitis in bulb, 1.5cm ulcer at posterior duodenal wall with visible vessel s/p epi, cauterization, 2 endoclips.  Pt was stable post EGD and was started on protonix 40 mg BID, Iron supplements, colace for discharge.  Pt will follow up with Dr. Daniel/ Dr. Mcgrath in 4 weeks.

## 2018-06-07 NOTE — DISCHARGE NOTE ADULT - CARE PLAN
Principal Discharge DX:	GI bleed  Goal:	Resolution  Assessment and plan of treatment:	Please start taking Protonix 40 mg twice a day and iron pills.  Please follow up with Dr. Mcgrath or Dr. Daniel in 4 weeks.    Gastrointestinal (GI) bleeding may occur in any part of your digestive tract. This includes your esophagus, stomach, intestines, rectum, or anus. The bleeding may or may not be seen easily. Bleeding may be sudden and last only a short time, or it may go on for a long time and occur more than once    Call your doctor and seek medical attention if you have any of the following:  You have bowel movements that are tarry or black.  You have abdominal pain or swelling, nausea, or vomiting.  You have heartburn or other signs of stomach acid problems.  You have questions or concerns about your condition or care.  Chest pain  Dizziness or fainting, especially after suddenly moving from a sitting or lying position  Confusion or shortness of breath  Weakness Principal Discharge DX:	GI bleed  Goal:	Resolution  Assessment and plan of treatment:	Please start taking Protonix 40 mg twice a day and iron pills.  Please follow up with Dr. Mcgrath or Dr. Daniel in 4 weeks.    Please avoid taking NSAIDs, drinking excess alcohol or smoking.  NSAIDs are drugs such as motrin, ibuprofen, naproxen, celebrex, diclofenac.  Please read the labels of all medications you take and make sure they do not fall under the category of NSAID.    Gastrointestinal (GI) bleeding may occur in any part of your digestive tract. This includes your esophagus, stomach, intestines, rectum, or anus. The bleeding may or may not be seen easily. Bleeding may be sudden and last only a short time, or it may go on for a long time and occur more than once    Call your doctor and seek medical attention if you have any of the following:  You have bowel movements that are tarry or black.  You have abdominal pain or swelling, nausea, or vomiting.  You have heartburn or other signs of stomach acid problems.  You have questions or concerns about your condition or care.  Chest pain  Dizziness or fainting, especially after suddenly moving from a sitting or lying position  Confusion or shortness of breath  Weakness

## 2018-06-07 NOTE — PROGRESS NOTE ADULT - SUBJECTIVE AND OBJECTIVE BOX
Chief Complaint:  Patient is a 62y old  Female who presents with a chief complaint of melena    HPI:  62 year old female with PMH of hypothyroidism, HLD, HTN, history of GI bleed came presenting with melena mixed with blood. patient reports that she woke up this morning and went to the bathroom but started to become dizzy and diaphoretic , she had 2 episodes of melena mixed with blood. patient reports taking NSAIDs occasionally. She had a similar episode few years ago. She denies any abdominal pain, nausea, vomiting or hematemesis.    She is s/p EGD with cauterization, injection and two clips applied to DU    Allergies:  No Known Allergies      Hospital Medications:      PMHX/PSHX:  Hypothyroid  GI hemorrhage  High cholesterol  CKD (chronic kidney disease)  HTN (hypertension)    ROS:   Eyes:  Good vision, no reported pain  ENT:  No sore throat, pain, runny nose, dysphagia  CV:  No pain, palpitations, hypo/hypertension  Resp:  No dyspnea, cough, tachypnea, wheezing  GI: see H&P  :  No pain, bleeding, incontinence, nocturia  Muscle:  No pain, weakness  Neuro:  No weakness, tingling, memory problems  Psych:  No fatigue, insomnia, mood problems, depression  Endocrine:  No polyuria, polydipsia, cold/heat intolerance  Heme:  No petechiae, ecchymosis, easy bruisability  Skin:  No rash, tattoos, scars, edema      PHYSICAL EXAM:   Vital Signs Last 24 Hrs  T(C): 37.1 (07 Jun 2018 14:06), Max: 37.1 (07 Jun 2018 14:06)  T(F): 98.8 (07 Jun 2018 14:06), Max: 98.8 (07 Jun 2018 14:06)  HR: 84 (07 Jun 2018 14:06) (65 - 90)  BP: 133/64 (07 Jun 2018 14:06) (110/56 - 135/65)  BP(mean): 76 (06 Jun 2018 16:00) (76 - 76)  RR: 18 (07 Jun 2018 14:06) (16 - 20)  SpO2: 100% (06 Jun 2018 22:17) (99% - 100%)    GENERAL:  Appears stated age, well-groomed, well-nourished, no distress  HEENT:  NC/AT,  conjunctivae clear and pink, no thyromegaly, nodules, adenopathy, no JVD, sclera -anicteric  CHEST:  Full & symmetric excursion, no increased effort, breath sounds clear  HEART:  Regular rhythm, S1, S2, no murmur/rub/S3/S4, no abdominal bruit, no edema  ABDOMEN:  Soft, non-tender, non-distended, normoactive bowel sounds,  no masses ,no hepato-splenomegaly,  EXTEREMITIES:  no cyanosis,clubbing or edema  SKIN:  No rash/erythema/ecchymoses/petechiae/wounds/abscess/warm/dry  NEURO:  Alert, oriented, no asterixis, no tremor, no encephalopathy    LABS:                                     7.7    9.19  )-----------( 222      ( 07 Jun 2018 07:49 )             24.5     06-07    147<H>  |  112<H>  |  61<HH>  ----------------------------<  99  4.8   |  23  |  2.1<H>    Ca    8.4<L>      07 Jun 2018 07:49  Mg     2.0     06-07    TPro  5.9<L>  /  Alb  3.7  /  TBili  0.5  /  DBili  x   /  AST  13  /  ALT  9   /  AlkPhos  70  06-06    LIVER FUNCTIONS - ( 06 Jun 2018 11:11 )  Alb: 3.7 g/dL / Pro: 5.9 g/dL / ALK PHOS: 70 U/L / ALT: 9 U/L / AST: 13 U/L / GGT: x           PT/INR - ( 05 Jun 2018 16:40 )   PT: 12.50 sec;   INR: 1.15 ratio         PTT - ( 05 Jun 2018 16:40 )  PTT:31.2 sec          Imaging:

## 2018-06-07 NOTE — PROGRESS NOTE ADULT - SUBJECTIVE AND OBJECTIVE BOX
HALEIGH, MINDY  62y  Female  ***My note supercedes ALL resident notes that I sign.  My corrections for their notes are in my note.***    I can be reached directly on [phone]. My office number is [phone].    INTERVAL EVENTS: Pt doing very well. No SOB or abd pain. Eating solid food w/out issue. No further bleeding. Pt feels ready to go home. NSAID use was very limited, but pt has been under a lot of family stress w/ a brother in a NH in [place], [place] (he has advanced Parkinson's Dz).    T(F): 97.6 (18 @ 05:00), Max: 98.3 (18 @ 21:32)  HR: 79 (18 @ 05:00) (65 - 90)  BP: 128/59 (18 @ 05:00) (110/56 - 135/65)  RR: 18 (18 @ 05:00) (16 - 25)  SpO2: 100% (18 @ 22:17) (99% - 100%)    Physical Exam:   GENERAL: NAD  HEENT: NCAT  CHEST/LUNG: CTAB  HEART: Regular rate and rhythm; s1 s2 appreciated,  ABDOMEN: Soft, Nontender, Nondistended  EXTREMITIES: No LE edema b/l  NERVOUS SYSTEM:  Alert & Oriented X3      LABS:                        7.7     (    94.6   9.19  )-----------( ---------      222      ( 2018 07:49 )             24.5    (    13.6     Hemoglobin: 7.7 g/dL ( 07:49)  Hemoglobin: 8.1 g/dL ( @ 21:34)  Hemoglobin: 8.5 g/dL ( @ 11:11)  Hemoglobin: 7.0 g/dL ( @ 00:42)  Hemoglobin: 7.7 g/dL ( @ 16:40)  Hemoglobin: 9.0 g/dL ( @ 06:51)      147   (   112   (   99      18 @ 07:49  ----------------------               4.8   (   23   (   61                             -----                        2.1    Creatinine:   2.1 ( @ 07:49)  GFR (AA):     29  GFR (non AA): 25    Creatinine:   2.3 ( @ 21:34)  GFR (AA):     26  GFR (non AA): 22    Creatinine:   2.1 ( @ 11:11)  GFR (AA):     29  GFR (non AA): 25    Creatinine:   2.1 ( @ 00:42)  GFR (AA):     29  GFR (non AA): 25    Creatinine:   2.1 ( @ 16:40)  GFR (AA):     29  GFR (non AA): 25    Creatinine:   2.5 ( @ 06:51)  GFR (AA):     23  GFR (non AA): 20      Ca  8.4   Mg  2.0    P   --     144   (   108   (   116      18 @ 21:34  ----------------------               4.7   (   21   (   69                             -----                        2.3  Ca  8.3   Mg  --    P   --     PT/INR - ( 2018 16:40 )   PT: 12.50 sec;   INR: 1.15 ratio    PTT - ( 2018 16:40 )  PTT:31.2 sec    Urinalysis Basic - ( 2018 09:18 )    Color: Yellow / Appearance: Clear / S.010 / pH: x  Gluc: x / Ketone: Negative  / Bili: Negative / Urobili: 0.2 mg/dL   Blood: x / Protein: Trace mg/dL / Nitrite: Negative   Leuk Esterase: Moderate / RBC: x / WBC 10-25 /HPF   Sq Epi: x / Non Sq Epi: Few /HPF / Bacteria: Few /HPF      RADIOLOGY & ADDITIONAL TESTS:      MEDICATIONS:    atorvastatin 40 milliGRAM(s) Oral at bedtime  docusate sodium 100 milliGRAM(s) Oral two times a day  ferrous    sulfate 325 milliGRAM(s) Oral daily  levothyroxine 200 MICROGram(s) Oral daily  multivitamin 1 Tablet(s) Oral daily  pantoprazole    Tablet 40 milliGRAM(s) Oral two times a day

## 2018-06-07 NOTE — PROGRESS NOTE ADULT - ASSESSMENT
Duodenal ulcer s/p EGD with injection/cauterization and two clips  Anemia    Plan  Carafate 1gm PO q ac and hs  Protonix 40 mg PO q 12  Monitor CBC, if repeat 7.5 and above can d/c home  [facility]
62Y F with pmh of gi bleeding 2/2 gastric ulcers in 2014/2015, htn, hypothyroidism, dld and CKD presents to the ED after two episodes of melena.    # Acute GI bleeding/melena 2/2 peptic ulcers; acut blood loss anemia (and anemia of CKD)  downgraded to floor and doing well;   protonix po q12   FeSO4 q12 and MVI q24 and colace  f/u w/ GI as outpt to r/o h pylori (pt believes she had this 3-4 yrs ago and was tx'd w/ abx)    # HTN  -pt was normotensive in ED and now  can leave off BP meds til f/u w/ PMD (Dr Chapman in [facility])    # DLD  -c/w atorvastatin    # Cr is stable so seems like new baseline; CKD 3  f/u w/ primary MD    # hypothyroidism  -c/w synthroid    Dispo: d/c home today, spoke w/ Dr Mcgrath -> if hgb 7.5 or higher can go, if 7.4 or lower keep overnight and trend hgb 8 pm and 4:30 am for tomorrow
IMPRESSION:    UGIB secondary to PUD.  SP EGD and clipping  Acute on chronic anemia  BHAVANA on CKD improving        PLAN:    CNS: no depressants    HEENT: Oral care    PULMONARY:  HOB @ 45 degrees    CARDIOVASCULAR: I = O. DC IVF    GI: Protonix BID.  Clears.  FU with GI     RENAL:  Follow up lytes .  Correct as needed    INFECTIOUS DISEASE: none    HEMATOLOGICAL:  CBC 11 am.      ENDOCRINE:  Follow up FS.      MUSCULOSKELETAL:     transfer to floor if CBC stable     OOB to chair

## 2018-06-07 NOTE — DISCHARGE NOTE ADULT - MEDICATION SUMMARY - MEDICATIONS TO TAKE
I will START or STAY ON the medications listed below when I get home from the hospital:    IRBESARTAN   TAB 300MG  -- Indication: For Hypertension    ROSUVASTATIN TAB 10MG  -- Indication: For Hyperlipidemia    AMLODIPINE   TAB 10MG  -- Indication: For Hypertension    ferrous sulfate 325 mg (65 mg elemental iron) oral tablet  -- 1 tab(s) by mouth once a day   -- Indication: For Iron Supplement    docusate sodium 100 mg oral capsule  -- 1 cap(s) by mouth 2 times a day  -- Indication: For Constipation    pantoprazole 40 mg oral delayed release tablet  -- 1 tab(s) by mouth 2 times a day  -- Indication: For GI bleed    SYNTHROID    TAB 200MCG  -- Indication: For Hypothyroid    Multiple Vitamins oral tablet  -- 1 tab(s) by mouth once a day  -- Indication: For Multvitamin

## 2018-06-07 NOTE — DISCHARGE NOTE ADULT - PATIENT PORTAL LINK FT
You can access the [facility], offered by [facility], by registering with the following website: [email or web address]

## 2018-06-07 NOTE — DISCHARGE NOTE ADULT - CARE PROVIDER_API CALL
Ellie Mcgrath), Gastroenterology  [address] [place] [address]  Phone: [phone]  Fax: [phone]

## 2018-06-11 LAB
CULTURE RESULTS: SIGNIFICANT CHANGE UP
SPECIMEN SOURCE: SIGNIFICANT CHANGE UP

## 2018-06-12 DIAGNOSIS — N18.3 CHRONIC KIDNEY DISEASE, STAGE 3 (MODERATE): ICD-10-CM

## 2018-06-12 DIAGNOSIS — K92.2 GASTROINTESTINAL HEMORRHAGE, UNSPECIFIED: ICD-10-CM

## 2018-06-12 DIAGNOSIS — D63.1 ANEMIA IN CHRONIC KIDNEY DISEASE: ICD-10-CM

## 2018-06-12 DIAGNOSIS — E03.9 HYPOTHYROIDISM, UNSPECIFIED: ICD-10-CM

## 2018-06-12 DIAGNOSIS — Z80.3 FAMILY HISTORY OF MALIGNANT NEOPLASM OF BREAST: ICD-10-CM

## 2018-06-12 DIAGNOSIS — E87.5 HYPERKALEMIA: ICD-10-CM

## 2018-06-12 DIAGNOSIS — E78.5 HYPERLIPIDEMIA, UNSPECIFIED: ICD-10-CM

## 2018-06-12 DIAGNOSIS — K29.70 GASTRITIS, UNSPECIFIED, WITHOUT BLEEDING: ICD-10-CM

## 2018-06-12 DIAGNOSIS — I12.9 HYPERTENSIVE CHRONIC KIDNEY DISEASE WITH STAGE 1 THROUGH STAGE 4 CHRONIC KIDNEY DISEASE, OR UNSPECIFIED CHRONIC KIDNEY DISEASE: ICD-10-CM

## 2018-06-12 DIAGNOSIS — N17.9 ACUTE KIDNEY FAILURE, UNSPECIFIED: ICD-10-CM

## 2018-06-12 DIAGNOSIS — K26.0 ACUTE DUODENAL ULCER WITH HEMORRHAGE: ICD-10-CM

## 2018-06-12 DIAGNOSIS — D62 ACUTE POSTHEMORRHAGIC ANEMIA: ICD-10-CM

## 2018-06-12 DIAGNOSIS — E66.9 OBESITY, UNSPECIFIED: ICD-10-CM

## 2022-10-17 PROBLEM — E03.9 HYPOTHYROIDISM, UNSPECIFIED: Chronic | Status: ACTIVE | Noted: 2018-06-05

## 2022-10-17 PROBLEM — K92.2 GASTROINTESTINAL HEMORRHAGE, UNSPECIFIED: Chronic | Status: ACTIVE | Noted: 2018-06-05

## 2022-10-17 PROBLEM — E78.00 PURE HYPERCHOLESTEROLEMIA, UNSPECIFIED: Chronic | Status: ACTIVE | Noted: 2018-06-05

## 2022-10-17 PROBLEM — I10 ESSENTIAL (PRIMARY) HYPERTENSION: Chronic | Status: ACTIVE | Noted: 2018-06-05

## 2022-10-17 PROBLEM — N18.9 CHRONIC KIDNEY DISEASE, UNSPECIFIED: Chronic | Status: ACTIVE | Noted: 2018-06-05

## 2022-11-14 PROBLEM — Z00.00 ENCOUNTER FOR PREVENTIVE HEALTH EXAMINATION: Status: ACTIVE | Noted: 2022-11-14

## 2022-11-16 ENCOUNTER — APPOINTMENT (OUTPATIENT)
Dept: CARDIOLOGY | Facility: CLINIC | Age: 67
End: 2022-11-16

## 2022-11-16 VITALS
OXYGEN SATURATION: 98 % | RESPIRATION RATE: 15 BRPM | HEIGHT: 62 IN | SYSTOLIC BLOOD PRESSURE: 138 MMHG | BODY MASS INDEX: 31.83 KG/M2 | WEIGHT: 173 LBS | DIASTOLIC BLOOD PRESSURE: 86 MMHG | HEART RATE: 68 BPM

## 2022-11-16 DIAGNOSIS — R01.1 CARDIAC MURMUR, UNSPECIFIED: ICD-10-CM

## 2022-11-16 DIAGNOSIS — R09.89 OTHER SPECIFIED SYMPTOMS AND SIGNS INVOLVING THE CIRCULATORY AND RESPIRATORY SYSTEMS: ICD-10-CM

## 2022-11-16 PROCEDURE — 93000 ELECTROCARDIOGRAM COMPLETE: CPT

## 2022-11-16 RX ORDER — ALPRAZOLAM 0.25 MG/1
0.25 TABLET ORAL
Qty: 90 | Refills: 0 | Status: ACTIVE | COMMUNITY
Start: 2022-10-15

## 2022-11-16 RX ORDER — NIFEDIPINE 90 MG/1
90 TABLET, EXTENDED RELEASE ORAL
Qty: 30 | Refills: 0 | Status: ACTIVE | COMMUNITY
Start: 2022-11-10

## 2022-11-16 RX ORDER — LEVOTHYROXINE SODIUM 175 UG/1
175 TABLET ORAL
Qty: 90 | Refills: 0 | Status: ACTIVE | COMMUNITY
Start: 2022-08-22

## 2022-11-16 RX ORDER — AMOXICILLIN 500 MG/1
500 CAPSULE ORAL
Qty: 21 | Refills: 0 | Status: DISCONTINUED | COMMUNITY
Start: 2022-10-29

## 2022-11-16 RX ORDER — AZITHROMYCIN 250 MG/1
250 TABLET, FILM COATED ORAL
Qty: 6 | Refills: 0 | Status: DISCONTINUED | COMMUNITY
Start: 2022-04-01

## 2022-11-16 RX ORDER — LEVOTHYROXINE SODIUM 88 UG/1
88 TABLET ORAL
Qty: 12 | Refills: 0 | Status: ACTIVE | COMMUNITY
Start: 2022-11-09

## 2022-11-16 RX ORDER — CANDESARTAN CILEXETIL 32 MG/1
32 TABLET ORAL
Qty: 90 | Refills: 0 | Status: ACTIVE | COMMUNITY
Start: 2022-09-22

## 2022-11-16 RX ORDER — ROSUVASTATIN CALCIUM 40 MG/1
40 TABLET, FILM COATED ORAL
Qty: 90 | Refills: 0 | Status: ACTIVE | COMMUNITY
Start: 2022-10-19

## 2022-11-17 NOTE — ASSESSMENT
[FreeTextEntry1] : #HTN\par #HLD\par #dyspnea\par #murmur\par #bruit?\par \par Plan:\par -Patient states BPs at home are usually 120/70.  Continue candesartan 32mg and nifedipine 90mg.\par -HLD is controlled.  Continue rosuvastatin 40mg daily.\par -Obtain TTE to assess cardiac function and valvular structures.\par -Recommend stress echo to rule out myocardial ischemia.\par -Possible carotid bruit heard on exam.  Check bilateral carotid duplex to rule out stenosis.\par -Followup after testing.\par -Patient aware of plan.\par \par

## 2022-11-17 NOTE — PHYSICAL EXAM
[Well Developed] : well developed [Well Nourished] : well nourished [No Acute Distress] : no acute distress [Normal Conjunctiva] : normal conjunctiva [Normal Venous Pressure] : normal venous pressure [Normal S1, S2] : normal S1, S2 [No Rub] : no rub [No Gallop] : no gallop [Murmur] : murmur [Clear Lung Fields] : clear lung fields [Good Air Entry] : good air entry [No Respiratory Distress] : no respiratory distress  [Soft] : abdomen soft [Non Tender] : non-tender [No Masses/organomegaly] : no masses/organomegaly [Normal Bowel Sounds] : normal bowel sounds [Normal Gait] : normal gait [No Edema] : no edema [No Cyanosis] : no cyanosis [No Clubbing] : no clubbing [No Varicosities] : no varicosities [No Rash] : no rash [No Skin Lesions] : no skin lesions [Moves all extremities] : moves all extremities [No Focal Deficits] : no focal deficits [Normal Speech] : normal speech [Alert and Oriented] : alert and oriented [Normal memory] : normal memory [Carotid Bruit] : carotid bruit [de-identified] : ?

## 2022-11-17 NOTE — HISTORY OF PRESENT ILLNESS
[FreeTextEntry1] : Patient is a 67 yr-old female with hx of HTN, HLD, hypothyroidism, CKD (baseline Cr 2.2, K 5.6), and anemia of chronic disease (10.8/32.9) presenting for evaluation of murmur.  Patient follows up with both PCP (Bozena) and nephrology (Jose) every 4 months.  She denies any chest pain, dizziness, palpitations, or leg swelling at this time.  She does have some shortness of breath with stair climbing that resolves with rest.  She reports walking 10-13k steps daily and she has been following weight watchers diet, stating she has not been this weight since high school.  \par \par Recent blood work reviewed from 11/4/22:\par \par \par HDL 50\par tri 119\par LDL 89\par HgbA1c 4.9\par \par

## 2022-12-01 ENCOUNTER — TRANSCRIPTION ENCOUNTER (OUTPATIENT)
Age: 67
End: 2022-12-01

## 2022-12-29 ENCOUNTER — APPOINTMENT (OUTPATIENT)
Dept: CARDIOLOGY | Facility: CLINIC | Age: 67
End: 2022-12-29
Payer: COMMERCIAL

## 2022-12-29 PROCEDURE — 99214 OFFICE O/P EST MOD 30 MIN: CPT

## 2023-01-04 NOTE — REVIEW OF SYSTEMS
[Negative] : Heme/Lymph [Chest Discomfort] : no chest discomfort [Lower Ext Edema] : no extremity edema [Leg Claudication] : no intermittent leg claudication [Palpitations] : no palpitations [Syncope] : no syncope [Cough] : no cough [FreeTextEntry5] : (+) Prior SOB on Exertion [FreeTextEntry6] : (+) Prior SOB on Exertion

## 2023-01-04 NOTE — DISCUSSION/SUMMARY
[FreeTextEntry1] : AS: The impression is aortic stenosis. Echocardiogram performed today revealed mild to moderate AS. Recommend a repeat echocardiogram in 6 months to reevaluate gradients. \par \par HTN: The impression is hypertension. Currently, the condition is stable. There are no changes in medication management. Continue current medical therapy. Other planned treatments include an exercise regimen, weight loss, low sodium diet, and alcohol moderation.\par \par HLD: The impression is hyperlipidemia. Currently, the condition is stable. There are no changes in medication management. Continue current medical therapy. Other planned treatment includes diet modification, exercise, and weight loss.\par \par Instructed to follow up in 6 months for repeat echo. \par Plan was discussed with the patient.

## 2023-01-04 NOTE — HISTORY OF PRESENT ILLNESS
[FreeTextEntry1] : KADY RICARDO is a 67-year-old female, with a PMHx significant for HTN, HLD, hypothyroidism, CKD (baseline Cr 2.2, K 5.6), and anemia of chronic disease (10.8/32.9), who presents today for echocardiogram results. Patient was initially seen on 11/16/2022 for evaluation of murmur. Patient complained of some shortness of breath with stair climbing that resolves with rest. TTE was recommended to assess cardiac function and valvular structures.\par \par Echocardiogram performed today revealed mild to moderate AS.

## 2023-03-10 ENCOUNTER — APPOINTMENT (OUTPATIENT)
Dept: CARDIOLOGY | Facility: CLINIC | Age: 68
End: 2023-03-10
Payer: COMMERCIAL

## 2023-03-10 PROCEDURE — 93320 DOPPLER ECHO COMPLETE: CPT

## 2023-03-10 PROCEDURE — 93325 DOPPLER ECHO COLOR FLOW MAPG: CPT

## 2023-03-10 PROCEDURE — 93351 STRESS TTE COMPLETE: CPT

## 2023-03-10 PROCEDURE — 99214 OFFICE O/P EST MOD 30 MIN: CPT

## 2023-03-13 NOTE — REVIEW OF SYSTEMS
[Negative] : Heme/Lymph [Chest Discomfort] : no chest discomfort [Leg Claudication] : no intermittent leg claudication [Lower Ext Edema] : no extremity edema [Palpitations] : no palpitations [Syncope] : no syncope [Cough] : no cough [FreeTextEntry5] : (+) Prior SOB on Exertion [FreeTextEntry6] : (+) Prior SOB on Exertion

## 2023-03-13 NOTE — HISTORY OF PRESENT ILLNESS
[FreeTextEntry1] : KADY RICARDO is a 67-year-old female, with a PMHx significant for HTN, HLD, hypothyroidism, CKD (baseline Cr 2.3, K 5.6), and anemia of chronic disease (10.8/32.9), who presents today for stress echo. Patient complains of some shortness of breath with stair climbing that resolves with rest.  Echocardiogram performed 12/2022 revealed mild to moderate AS.

## 2023-03-21 ENCOUNTER — NEW PATIENT (OUTPATIENT)
Dept: URBAN - METROPOLITAN AREA CLINIC 40 | Facility: CLINIC | Age: 68
End: 2023-03-21

## 2023-03-21 DIAGNOSIS — H04.123: ICD-10-CM

## 2023-03-21 DIAGNOSIS — H25.13: ICD-10-CM

## 2023-03-21 PROCEDURE — 99203 OFFICE O/P NEW LOW 30 MIN: CPT

## 2023-03-21 ASSESSMENT — VISUAL ACUITY
OS_CC: 20/30
OU_CC: J1
OD_CC: 20/25

## 2023-03-21 ASSESSMENT — TONOMETRY
OD_IOP_MMHG: 11
OS_IOP_MMHG: 10

## 2023-06-26 ENCOUNTER — APPOINTMENT (OUTPATIENT)
Dept: CARDIOLOGY | Facility: CLINIC | Age: 68
End: 2023-06-26
Payer: COMMERCIAL

## 2023-06-26 DIAGNOSIS — R06.02 SHORTNESS OF BREATH: ICD-10-CM

## 2023-06-26 PROCEDURE — 93306 TTE W/DOPPLER COMPLETE: CPT

## 2023-12-22 ENCOUNTER — APPOINTMENT (OUTPATIENT)
Dept: CARDIOLOGY | Facility: CLINIC | Age: 68
End: 2023-12-22

## 2024-01-09 ENCOUNTER — APPOINTMENT (OUTPATIENT)
Dept: CARDIOLOGY | Facility: CLINIC | Age: 69
End: 2024-01-09
Payer: COMMERCIAL

## 2024-01-09 DIAGNOSIS — I10 ESSENTIAL (PRIMARY) HYPERTENSION: ICD-10-CM

## 2024-01-09 DIAGNOSIS — I35.0 NONRHEUMATIC AORTIC (VALVE) STENOSIS: ICD-10-CM

## 2024-01-09 DIAGNOSIS — E78.5 HYPERLIPIDEMIA, UNSPECIFIED: ICD-10-CM

## 2024-01-09 PROCEDURE — 93306 TTE W/DOPPLER COMPLETE: CPT

## 2024-01-09 PROCEDURE — 99214 OFFICE O/P EST MOD 30 MIN: CPT | Mod: 25

## 2024-01-10 NOTE — HISTORY OF PRESENT ILLNESS
[FreeTextEntry1] : KADY RICARDO is a 68-year-old female, with a PMHx significant for mild-moderate AS, HTN, HLD, hypothyroidism, CKD (baseline Cr 2.2, K 5.6), and anemia of chronic disease (10.8/32.9), who presents today for a follow-up visit for an echocardiogram. Last echocardiogram performed in June revealed mild-moderate aortic stenosis. Denies any new complaints. Reports she is feeling well. Otherwise: (-) chest pain, (-) SOB.

## 2024-01-10 NOTE — DISCUSSION/SUMMARY
[FreeTextEntry1] : Aortic Stenosis: Echocardiogram performed today revealed mild aortic stenosis.   HTN: The impression is hypertension. Currently, the condition is controlled. There are no changes in medication management. Continue current medical therapy. Other planned treatments include an exercise regimen, weight loss, low sodium diet, and alcohol moderation.  HLD: The impression is hyperlipidemia. Currently, the condition is stable. There are no changes in medication management. Continue current medical therapy. Other planned treatment includes diet modification, exercise, and weight loss.  Instructed to follow up in 1 year.  Plan was discussed with the patient.

## 2024-03-06 NOTE — DISCHARGE NOTE ADULT - PLAN OF CARE
Patient/Caregiver provided printed discharge information. Resolution Please start taking Protonix 40 mg twice a day and iron pills.  Please follow up with Dr. Mcgrath or Dr. Daniel in 4 weeks.    Gastrointestinal (GI) bleeding may occur in any part of your digestive tract. This includes your esophagus, stomach, intestines, rectum, or anus. The bleeding may or may not be seen easily. Bleeding may be sudden and last only a short time, or it may go on for a long time and occur more than once    Call your doctor and seek medical attention if you have any of the following:  You have bowel movements that are tarry or black.  You have abdominal pain or swelling, nausea, or vomiting.  You have heartburn or other signs of stomach acid problems.  You have questions or concerns about your condition or care.  Chest pain  Dizziness or fainting, especially after suddenly moving from a sitting or lying position  Confusion or shortness of breath  Weakness Please start taking Protonix 40 mg twice a day and iron pills.  Please follow up with Dr. Mcgrath or Dr. Daniel in 4 weeks.    Please avoid taking NSAIDs, drinking excess alcohol or smoking.  NSAIDs are drugs such as motrin, ibuprofen, naproxen, celebrex, diclofenac.  Please read the labels of all medications you take and make sure they do not fall under the category of NSAID.    Gastrointestinal (GI) bleeding may occur in any part of your digestive tract. This includes your esophagus, stomach, intestines, rectum, or anus. The bleeding may or may not be seen easily. Bleeding may be sudden and last only a short time, or it may go on for a long time and occur more than once    Call your doctor and seek medical attention if you have any of the following:  You have bowel movements that are tarry or black.  You have abdominal pain or swelling, nausea, or vomiting.  You have heartburn or other signs of stomach acid problems.  You have questions or concerns about your condition or care.  Chest pain  Dizziness or fainting, especially after suddenly moving from a sitting or lying position  Confusion or shortness of breath  Weakness Qbrexza Pregnancy And Lactation Text: There is no available data on Qbrexza use in pregnant women.  There is no available data on Qbrexza use in lactation.

## 2024-07-25 ENCOUNTER — APPOINTMENT (OUTPATIENT)
Dept: NEUROLOGY | Facility: CLINIC | Age: 69
End: 2024-07-25

## 2025-01-22 ENCOUNTER — APPOINTMENT (OUTPATIENT)
Dept: CARDIOLOGY | Facility: CLINIC | Age: 70
End: 2025-01-22
Payer: COMMERCIAL

## 2025-01-22 ENCOUNTER — NON-APPOINTMENT (OUTPATIENT)
Age: 70
End: 2025-01-22

## 2025-01-22 VITALS
BODY MASS INDEX: 32.2 KG/M2 | WEIGHT: 175 LBS | DIASTOLIC BLOOD PRESSURE: 88 MMHG | HEIGHT: 62 IN | OXYGEN SATURATION: 98 % | RESPIRATION RATE: 16 BRPM | SYSTOLIC BLOOD PRESSURE: 130 MMHG | HEART RATE: 62 BPM

## 2025-01-22 DIAGNOSIS — R06.02 SHORTNESS OF BREATH: ICD-10-CM

## 2025-01-22 DIAGNOSIS — I35.0 NONRHEUMATIC AORTIC (VALVE) STENOSIS: ICD-10-CM

## 2025-01-22 DIAGNOSIS — I10 ESSENTIAL (PRIMARY) HYPERTENSION: ICD-10-CM

## 2025-01-22 DIAGNOSIS — E78.5 HYPERLIPIDEMIA, UNSPECIFIED: ICD-10-CM

## 2025-01-22 PROCEDURE — G2211 COMPLEX E/M VISIT ADD ON: CPT | Mod: NC

## 2025-01-22 PROCEDURE — 99204 OFFICE O/P NEW MOD 45 MIN: CPT | Mod: 25

## 2025-01-22 PROCEDURE — 93306 TTE W/DOPPLER COMPLETE: CPT

## 2025-01-22 RX ORDER — ALPRAZOLAM 0.5 MG/1
0.5 TABLET ORAL 3 TIMES DAILY
Refills: 0 | Status: ACTIVE | COMMUNITY
Start: 2025-01-22

## 2025-01-22 RX ORDER — PARICALCITOL 1 UG/1
1 CAPSULE ORAL DAILY
Refills: 0 | Status: ACTIVE | COMMUNITY
Start: 2025-01-22

## 2025-01-22 RX ORDER — LEVOTHYROXINE SODIUM 150 UG/1
150 TABLET ORAL DAILY
Refills: 0 | Status: ACTIVE | COMMUNITY
Start: 2025-01-22

## 2025-02-10 ENCOUNTER — APPOINTMENT (OUTPATIENT)
Dept: CARDIOLOGY | Facility: CLINIC | Age: 70
End: 2025-02-10
Payer: COMMERCIAL

## 2025-02-10 DIAGNOSIS — I10 ESSENTIAL (PRIMARY) HYPERTENSION: ICD-10-CM

## 2025-02-10 DIAGNOSIS — R06.02 SHORTNESS OF BREATH: ICD-10-CM

## 2025-02-10 DIAGNOSIS — Z01.818 ENCOUNTER FOR OTHER PREPROCEDURAL EXAMINATION: ICD-10-CM

## 2025-02-10 DIAGNOSIS — I35.0 NONRHEUMATIC AORTIC (VALVE) STENOSIS: ICD-10-CM

## 2025-02-10 DIAGNOSIS — E78.5 HYPERLIPIDEMIA, UNSPECIFIED: ICD-10-CM

## 2025-02-10 PROCEDURE — 93351 STRESS TTE COMPLETE: CPT

## 2025-02-10 PROCEDURE — 93325 DOPPLER ECHO COLOR FLOW MAPG: CPT

## 2025-02-10 PROCEDURE — 93320 DOPPLER ECHO COMPLETE: CPT

## 2025-02-10 PROCEDURE — 99214 OFFICE O/P EST MOD 30 MIN: CPT

## 2025-03-06 ENCOUNTER — APPOINTMENT (OUTPATIENT)
Dept: CARDIOLOGY | Facility: CLINIC | Age: 70
End: 2025-03-06
Payer: COMMERCIAL

## 2025-03-06 ENCOUNTER — APPOINTMENT (OUTPATIENT)
Dept: CARDIOTHORACIC SURGERY | Facility: CLINIC | Age: 70
End: 2025-03-06

## 2025-03-06 VITALS
WEIGHT: 178 LBS | RESPIRATION RATE: 16 BRPM | DIASTOLIC BLOOD PRESSURE: 75 MMHG | HEART RATE: 74 BPM | SYSTOLIC BLOOD PRESSURE: 126 MMHG | TEMPERATURE: 98.3 F | HEIGHT: 62 IN | BODY MASS INDEX: 32.76 KG/M2 | OXYGEN SATURATION: 98 %

## 2025-03-06 DIAGNOSIS — Z00.00 ENCOUNTER FOR GENERAL ADULT MEDICAL EXAMINATION W/OUT ABNORMAL FINDINGS: ICD-10-CM

## 2025-03-06 DIAGNOSIS — I35.0 NONRHEUMATIC AORTIC (VALVE) STENOSIS: ICD-10-CM

## 2025-03-06 PROCEDURE — 99214 OFFICE O/P EST MOD 30 MIN: CPT

## 2025-03-10 NOTE — CARDIOLOGY SUMMARY
Subjective:       Patient ID: Demario Calvin is a 57 y.o. male.    Chief Complaint: Annual Exam    Patient here today for annual well adult exam.   Tries to eat a healthy diet.  Active around the house.   Occasional cigar smoking now.  Did smoke 2 packs/day for about 30 years.  Quit about 6 years ago  Immunizations: Due Shingrix #2, Prevnar  Last Lab Work: 2024  Colon Ca screening: Colonoscopy 2019  Prostate Ca Screening: PSA 2024     MDD:  retired [employer] .  Was on Cymbalta.  No longer on medication and doing well at this time  RLS:  was on Mirapex.  Currently doing well off medication  GERD:  using OTC daily.  Rare reflux at night.        Review of Systems   Constitutional:  Negative for appetite change, fatigue and fever.   HENT:  Negative for congestion, sneezing and sore throat.    Respiratory:  Negative for cough, shortness of breath and wheezing.    Cardiovascular:  Negative for chest pain and palpitations.   Gastrointestinal:  Negative for abdominal pain, constipation, diarrhea, nausea and vomiting.   Genitourinary:  Negative for difficulty urinating, dysuria, frequency and hematuria.   Neurological:  Negative for dizziness, syncope, weakness and headaches.   Psychiatric/Behavioral:  Negative for agitation, behavioral problems and confusion. The patient is not nervous/anxious.        Objective:      Vitals:    03/10/25 0935   BP: 122/84   Pulse: 68   Temp: 97.3 °F (36.3 °C)   SpO2: 97%   Weight: 83.3 kg (183 lb 10.3 oz)   Height: 6' (1.829 m)      Physical Exam  Constitutional:       General: He is not in acute distress.  Cardiovascular:      Rate and Rhythm: Normal rate and regular rhythm.      Heart sounds: Normal heart sounds. No murmur heard.  Pulmonary:      Effort: Pulmonary effort is normal. No respiratory distress.      Breath sounds: Normal breath sounds. No wheezing, rhonchi or rales.   Musculoskeletal:         General: No swelling.   Skin:     General: Skin is warm and dry.   Neurological:      [de-identified] : Exercise Stress Echo - 03/10/2023: CONCLUSIONS: 1. Treadmill exercise Stress Echo Test. 2. Normal stress echocardiogram with normal augmentation of left ventricular systolic function. 3. No evidence of induced ischemia. Normal electrocardiography. No ECG evidence of ischemia at or near maximal predicted heart rate. =======================================================  General: No focal deficit present.      Mental Status: He is alert.   Psychiatric:         Mood and Affect: Mood normal.         Behavior: Behavior normal.         Thought Content: Thought content normal.            Assessment:       1. Well adult exam    2. Restless leg syndrome    3. Screening for prostate cancer    4. Need for vaccination    5. Screening for lung cancer        Plan:       Well adult exam  -     CBC Auto Differential; Future; Expected date: 03/10/2025  -     Comprehensive Metabolic Panel; Future; Expected date: 03/10/2025  -     Hemoglobin A1C; Future; Expected date: 03/10/2025  -     Lipid Panel; Future; Expected date: 03/10/2025  -     PSA, Screening; Future; Expected date: 03/10/2025  -     TSH; Future; Expected date: 03/10/2025  Continue to work on dietary improvements (decrease overall calorie intake, decrease sugar and carb intake, decrease animal protein intake)  Continue to exercise at least 30-40 minutes, 3 times per week  Immunizations were discussed and Prevnar 20 today.  Will get Shingrix #2 at his pharmacy in a couple of weeks  Preventative exams were discussed and Lab work today.  Repeat CT chest ordered.  Restless leg syndrome  Stable off medication  Screening for prostate cancer  -     PSA, Screening; Future; Expected date: 03/10/2025    Need for vaccination  -     pneumoc 20-velma conj-dip cr(PF) (PREVNAR-20 (PF)) injection Syrg 0.5 mL    Screening for lung cancer  -     CT Chest Lung Screening Low Dose; Future; Expected date: 03/10/2025                [de-identified] : TTE - 06/26/2023: CONCLUSIONS: 1. The left ventricular systolic function is normal with an ejection fraction of 64 % by Cochran's method of disks with an ejection fraction visually estimated at 60 to 65 %. 2. Normal atria. 3. Normal right ventricular systolic function. The tricuspid annular plane systolic excursion (TAPSE) is 1.8 cm (normal >=1.7 cm). 4. When compared to prior echocardiogram dated 12/2022, there is no change in the aortic valve gradients. 5. Structurally normal pulmonic valve with normal leaflet excursion. 6. Structurally normal mitral valve with normal leaflet excursion. 7. No pericardial effusion seen. 8. Structurally normal tricuspid valve with normal leaflet excursion. Mild tricuspid regurgitation. 9. Compared to the transthoracic echocardiogram performed on 12/29/2022 there have been no significant interval changes. 10. Mild-to-moderate aortic stenosis. 11. Normal mitral valve structure and function. No mitral stenosis or significant regurgitation. 12. The pulmonary valve is normal in structure and function, with good leaflet excursion, and without any evidence of pulmonary stenosis or significant regurgitation. 13. Trace aortic regurgitation. =======================================================

## 2025-04-16 NOTE — PATIENT PROFILE ADULT. - NSTRANFUSIONOBJECTION_GEN_ALL_CORE_SIUH
Patient Name:  James Solares  YOB: 1944  MRN:  [id number]  Admit Date:  4/16/2025  Patient Care Team:  Abhinav Barr MD as PCP - General (Family Medicine)  Cory Yeung MD as Consulting Physician (Ophthalmology)  Wesley Kaur MD as Consulting Physician (Ophthalmology)  Abhinav Barr MD as Referring Physician (Family Medicine)  Lidia Leonard MD as Consulting Physician (Hematology and Oncology)  Maicol Alfredo Jr., MD as Consulting Physician (Urology)  Garth Yeung MD as Consulting Physician (Gastroenterology)  Carter Doshi MD as Consulting Physician (Pulmonary Disease)  Dori Cohen MD as Consulting Physician (Nephrology)  Bushra Carpenter APRN as Nurse Practitioner (Cardiology)  Rachel Macdonald APRN as Nurse Practitioner (Urology)  Hamzah Quinn MD as Consulting Physician (Neurology)  Tyrel Khanna Jr., MD as Consulting Physician (Cardiology)  Janeth Marti MD (Dermatology)  Hieu Larose MD as Consulting Physician (Otolaryngology)      Subjective   History Present Illness     Chief Complaint   Patient presents with    Respiratory Distress   This is an 80-year-old male with past medical history of congestive heart failure, atrial fibrillation on chronic defibrillation, history of lung cancer, just lung disease under the hospital with shortness of breath that began 2 days prior to presenting to the ER.  Laboratory from the over significant for proBNP elevation at 2400.  Blood clots count was 16 point WBC was 15.6.  Chest x-ray showed interstitial prominence with some possible vascular congestion and volume loss in the right hemithorax which was noted to be stable from previous.  He subsequently underwent a CT angiogram that was concerning for congestive heart failure and with stable moderate to large pericardial effusion.  Troponins were negative.  He was admitted for further evaluation management              Personal History     Past Medical  History:   Diagnosis Date    A-fib     Abnormal magnetic resonance cholangiopancreatography (MRCP) 03/13/2016    unremarkable    Acute congestive heart failure 01/17/2022    Anxiety     Arrhythmia     CKD (chronic kidney disease)     Congestive heart failure (CHF)     COPD (chronic obstructive pulmonary disease)     Degeneration of cervical intervertebral disc     Erectile dysfunction     GERD (gastroesophageal reflux disease)     H/O colonoscopy 08/02/2006    H/O complete eye exam 2017    H/O: pneumonia 2016    Hernia     History of CT scan of abdomen 03/09/2016    tics, mild to moderate hydronephrosis, non obstructive 6 mm left ureter, cluster of smaller stones, chronic inflammation both lung bases, mild bronchiectasis    History of kidney stones     Hyperlipidemia     Hypertension     Infectious viral hepatitis     B    Interstitial lung disease     Kidney stones 2016    Liver cyst     Lung cancer     Lung nodule     ANUJ (obstructive sleep apnea)     Not on CPAP    Permanent atrial fibrillation 03/24/2023    Pneumonia     Primary biliary cirrhosis     Prostate atrophy     Prostate cancer     Syncope and collapse 01/24/2024    Tuberculosis      Past Surgical History:   Procedure Laterality Date    BRONCHOSCOPY      BRONCHOSCOPY Bilateral 01/19/2022    Procedure: BRONCHOSCOPY WITH LAVAGE;  Surgeon: Herberth Bowie MD;  Location: [facility] ENDOSCOPY;  Service: Pulmonary;  Laterality: Bilateral;  PRE- INTERSTITIAL LUNG DISEASE  POST--INTERSTITIAL LUNG DISEASE    CARDIAC CATHETERIZATION N/A 06/13/2023    Procedure: Right Heart Cath;  Surgeon: Wendy Head MD;  Location:  [facility] CATH INVASIVE [facility]ATION;  Service: Cardiovascular;  Laterality: N/A;    CARDIAC CATHETERIZATION N/A 06/13/2023    Procedure: Left Heart Cath;  Surgeon: Wendy Head MD;  Location:  [facility]ATH [facility]VASIVE [facility];  Service: Cardiovascular;  Laterality: N/A;    CARDIAC CATHETERIZATION N/A 06/13/2023    Procedure: Coronary angiography;  Surgeon:  "Wendy Head MD;  Location: [facility] CATH INVASIVE LOCATION;  Service: Cardiovascular;  Laterality: N/A;    CARDIAC CATHETERIZATION  2023    CATARACT EXTRACTION Left     COLONOSCOPY  2006    HERNIA REPAIR      KIDNEY STONE SURGERY      PROSTATE SURGERY  2015    stent placed    THORACOSCOPY Right 2019    Procedure: BRONCHOSCOPY RIGHT VIDEO ASSISTED THORACOSCOPY WITH ROBOT ASSISTED RIGHT LOWER LOBECTOMY, MEDIASTINAL LYMPHADENECTOMY, INTERCOSTAL NERVE BLOCK WITH EXPAREL;  Surgeon: Nato Sorto III, MD;  Location: [facility] OR;  Service: DaVinci    THORACOSCOPY VIDEO ASSISTED WITH LOBECTOMY Right      Family History   Problem Relation Age of Onset    Heart disease Father         Father with \"heart problems\"     Hypertension Father     Breast cancer Mother     Lung cancer Brother     Colon cancer Sister     Melanoma Brother     Colon cancer Sister     Malig Hyperthermia Neg Hx      Social History     Tobacco Use    Smoking status: Former     Current packs/day: 0.00     Average packs/day: 0.5 packs/day for 15.0 years (7.5 ttl pk-yrs)     Types: Cigarettes     Start date: 1983     Quit date:      Years since quittin.3     Passive exposure: Past    Smokeless tobacco: Never    Tobacco comments:     Smoked 1/2 ppd for 30 years - quit in    Vaping Use    Vaping status: Never Used   Substance Use Topics    Alcohol use: Yes     Comment: Rare    Drug use: Not Currently     Types: Marijuana     No current facility-administered medications on file prior to encounter.     Current Outpatient Medications on File Prior to Encounter   Medication Sig Dispense Refill    albuterol (PROVENTIL) (2.5 MG/3ML) 0.083% nebulizer solution Take 1 vial by nebulization 4 (Four) Times a Day As Needed for Wheezing. 90 mL 0    albuterol sulfate  (90 Base) MCG/ACT inhaler Inhale 2 puffs Every 6 (Six) Hours As Needed for Wheezing. 8 g 5    alfuzosin (UROXATRAL) 10 MG 24 hr tablet Take 1 tablet by mouth " Daily. 90 tablet 1    ALPRAZolam (XANAX) 0.5 MG tablet Take 1 tablet by mouth Daily As Needed for Anxiety. 90 tablet 0    apixaban (Eliquis) 5 MG tablet tablet Take 1 tablet by mouth Every 12 (Twelve) Hours. 180 tablet 3    bisoprolol (ZEBeta) 5 MG tablet TAKE 1/2 TABLET BY MOUTH EVERY DAY 45 tablet 2    Dilt- MG 24 hr capsule TAKE 1 CAPSULE BY MOUTH EVERY DAY 90 capsule 0    escitalopram (Lexapro) 10 MG tablet Take 1 tablet by mouth Daily. 90 tablet 1    eszopiclone (LUNESTA) 3 MG tablet Take 1 tablet by mouth Every Night. Take immediately before bedtime 90 tablet 0    finasteride (PROSCAR) 5 MG tablet Take 1 tablet by mouth Daily. 90 tablet 1    furosemide (LASIX) 20 MG tablet Take 2 tablets by mouth Daily. 90 tablet 1    ipratropium-albuterol (DUO-NEB) 0.5-2.5 mg/3 ml nebulizer Take 3 mL by nebulization 4 (Four) Times a Day. 360 mL     Lysine 500 MG capsule Take 1 capsule by mouth Daily.      Multiple Vitamin (MULTI-VITAMIN DAILY PO) Take 1 tablet by mouth Daily.      potassium chloride (MICRO-K) 10 MEQ CR capsule Take 1 capsule by mouth Daily As Needed (with lasix). With lasix 90 capsule 1    simvastatin (ZOCOR) 20 MG tablet Take 1 tablet by mouth Every Night. 90 tablet 1    ursodiol (ACTIGALL) 500 MG tablet Take 1 tablet by mouth 2 (Two) Times a Day. 180 tablet 3    allopurinol (Zyloprim) 100 MG tablet Take 1 tablet by mouth Daily. (Patient not taking: Reported on 3/13/2025) 90 tablet 1     Allergies   Allergen Reactions    Tylenol [Acetaminophen] Other (See Comments)     Interaction with other medication patient is taking currently     Sulfa Antibiotics Nausea And Vomiting and GI Intolerance       Objective    Objective     Vital Signs  Temp:  [98.6 °F (37 °C)-99.2 °F (37.3 °C)] 98.6 °F (37 °C)  Heart Rate:  [] 109  Resp:  [20-30] 20  BP: ()/(49-90) 99/67  SpO2:  [88 %-96 %] 96 %  on  Flow (L/min) (Oxygen Therapy):  [4-12] 4;   Device (Oxygen Therapy): nasal cannula  Body mass index is 24.62  kg/m².    Physical Exam  Constitutional:       General: He is not in acute distress.  HENT:      Head: Normocephalic and atraumatic.   Eyes:      Extraocular Movements: Extraocular movements intact.      Pupils: Pupils are equal, round, and reactive to light.   Cardiovascular:      Rate and Rhythm: Normal rate and regular rhythm.   Pulmonary:      Effort: Pulmonary effort is normal. No respiratory distress.   Abdominal:      General: There is no distension.      Palpations: Abdomen is soft.      Tenderness: There is no abdominal tenderness.   Neurological:      General: No focal deficit present.      Mental Status: He is alert and oriented to person, place, and time.         Results Review:  I reviewed the patient's new clinical results.  I reviewed the patient's new imaging results and agree with the interpretation.  I reviewed the patient's other test results and agree with the interpretation  I personally viewed and interpreted the patient's EKG/Telemetry data  Discussed with ED provider.    Lab Results (last 24 hours)       Procedure Component Value Units Date/Time    CBC & Differential [[id number]]  (Abnormal) Collected: 04/16/25 0248    Specimen: Blood Updated: 04/16/25 0258    Narrative:      The following orders were created for panel order CBC & Differential.  Procedure                               Abnormality         Status                     ---------                               -----------         ------                     CBC Auto Differential[[id number]]        Abnormal            Final result                 Please view results for these tests on the individual orders.    Comprehensive Metabolic Panel [[id number]]  (Abnormal) Collected: 04/16/25 0248    Specimen: Blood Updated: 04/16/25 0318     Glucose 115 mg/dL      BUN 20 mg/dL      Creatinine 1.34 mg/dL      Sodium 144 mmol/L      Potassium 3.9 mmol/L      Chloride 102 mmol/L      CO2 28.0 mmol/L      Calcium 9.2 mg/dL      Total Protein 6.8  g/dL      Albumin 3.9 g/dL      ALT (SGPT) 22 U/L      AST (SGOT) 42 U/L      Alkaline Phosphatase 145 U/L      Total Bilirubin 2.7 mg/dL      Globulin 2.9 gm/dL      A/G Ratio 1.3 g/dL      BUN/Creatinine Ratio 14.9     Anion Gap 14.0 mmol/L      eGFR 53.6 mL/min/1.73     Narrative:      GFR Categories in Chronic Kidney Disease (CKD)      GFR Category          GFR (mL/min/1.73)    Interpretation  G1                     90 or greater         Normal or high (1)  G2                      60-89                Mild decrease (1)  G3a                   45-59                Mild to moderate decrease  G3b                   30-44                Moderate to severe decrease  G4                    15-29                Severe decrease  G5                    14 or less           Kidney failure          (1)In the absence of evidence of kidney disease, neither GFR category G1 or G2 fulfill the criteria for CKD.    eGFR calculation 2021 CKD-EPI creatinine equation, which does not include race as a factor    BNP [[id number]]  (Abnormal) Collected: 04/16/25 0248    Specimen: Blood Updated: 04/16/25 0318     proBNP 2,450.0 pg/mL     Narrative:      This assay is used as an aid in the diagnosis of individuals suspected of having heart failure. It can be used as an aid in the diagnosis of acute decompensated heart failure (ADHF) in patients presenting with signs and symptoms of ADHF to the emergency department (ED). In addition, NT-proBNP of <300 pg/mL indicates ADHF is not likely.    Age Range Result Interpretation  NT-proBNP Concentration (pg/mL:      <50             Positive            >450                   Gray                 300-450                    Negative             <300    50-75           Positive            >900                  Gray                300-900                  Negative            <300      >75             Positive            >1800                  Gray                300-1800                  Negative             <300    High Sensitivity Troponin T [[id number]]  (Normal) Collected: 04/16/25 0248    Specimen: Blood Updated: 04/16/25 0318     HS Troponin T 19 ng/L     Narrative:      High Sensitive Troponin T Reference Range:  <14.0 ng/L- Negative Female for AMI  <22.0 ng/L- Negative Male for AMI  >=14 - Abnormal Female indicating possible myocardial injury.  >=22 - Abnormal Male indicating possible myocardial injury.   Clinicians would have to utilize clinical acumen, EKG, Troponin, and serial changes to determine if it is an Acute Myocardial Infarction or myocardial injury due to an underlying chronic condition.         aPTT [[id number]]  (Normal) Collected: 04/16/25 0248    Specimen: Blood Updated: 04/16/25 0325     PTT 30.1 seconds     Protime-INR [[id number]]  (Abnormal) Collected: 04/16/25 0248    Specimen: Blood Updated: 04/16/25 0325     Protime 18.7 Seconds      INR 1.56    CBC Auto Differential [[id number]]  (Abnormal) Collected: 04/16/25 0248    Specimen: Blood Updated: 04/16/25 0258     WBC 3.20 10*3/mm3      RBC 4.31 10*6/mm3      Hemoglobin 13.3 g/dL      Hematocrit 41.3 %      MCV 95.8 fL      MCH 30.9 pg      MCHC 32.2 g/dL      RDW 12.5 %      RDW-SD 44.3 fl      MPV 9.4 fL      Platelets 194 10*3/mm3      Neutrophil % 91.3 %      Lymphocyte % 6.6 %      Monocyte % 0.6 %      Eosinophil % 0.9 %      Basophil % 0.3 %      Immature Grans % 0.3 %      Neutrophils, Absolute 2.92 10*3/mm3      Lymphocytes, Absolute 0.21 10*3/mm3      Monocytes, Absolute 0.02 10*3/mm3      Eosinophils, Absolute 0.03 10*3/mm3      Basophils, Absolute 0.01 10*3/mm3      Immature Grans, Absolute 0.01 10*3/mm3      nRBC 0.0 /100 WBC     Blood Gas, Arterial - [[id number]]  (Abnormal) Collected: 04/16/25 0248    Specimen: Arterial Blood Updated: 04/16/25 0253     Site Left Radial     Yakov's Test Positive     pH, Arterial 7.435 pH units      pCO2, Arterial 40.0 mm Hg      pO2, Arterial 64.4 mm Hg      HCO3, Arterial 26.9 mmol/L      Base  Excess, Arterial 2.4 mmol/L      Comment: Serial Number: [id number]:  [id number]        O2 Saturation, Arterial 92.9 %      A-a DO2 0.2 mmHg      Barometric Pressure for Blood Gas 750.9000 mmHg      Modality HFNC     FIO2 60 %      Flow Rate 8.0000 lpm      Rate 20 Breaths/minute      Hemodilution No     Device Comment sat 93%     PO2/FIO2 107    Respiratory Panel PCR w/COVID-19(SARS-CoV-2) [facility] In-House, NP Swab in UTM/VTM, 2 HR TAT - Swab, Nasopharynx [[id number]]  (Normal) Collected: 04/16/25 0250    Specimen: Swab from Nasopharynx Updated: 04/16/25 5016     ADENOVIRUS, PCR Not Detected     Coronavirus 229E Not Detected     Coronavirus HKU1 Not Detected     Coronavirus NL63 Not Detected     Coronavirus OC43 Not Detected     COVID19 Not Detected     Human Metapneumovirus Not Detected     Human Rhinovirus/Enterovirus Not Detected     Influenza A PCR Not Detected     Influenza B PCR Not Detected     Parainfluenza Virus 1 Not Detected     Parainfluenza Virus 2 Not Detected     Parainfluenza Virus 3 Not Detected     Parainfluenza Virus 4 Not Detected     RSV, PCR Not Detected     Bordetella pertussis pcr Not Detected     Bordetella parapertussis PCR Not Detected     Chlamydophila pneumoniae PCR Not Detected     Mycoplasma pneumo by PCR Not Detected    Narrative:      In the setting of a positive respiratory panel with a viral infection PLUS a negative procalcitonin without other underlying concern for bacterial infection, consider observing off antibiotics or discontinuation of antibiotics and continue supportive care. If the respiratory panel is positive for atypical bacterial infection (Bordetella pertussis, Chlamydophila pneumoniae, or Mycoplasma pneumoniae), consider antibiotic de-escalation to target atypical bacterial infection.    High Sensitivity Troponin T 1Hr [[id number]]  (Normal) Collected: 04/16/25 0411    Specimen: Blood Updated: 04/16/25 4988     HS Troponin T 19 ng/L      Troponin T  Numeric Delta 0 ng/L     Narrative:      High Sensitive Troponin T Reference Range:  <14.0 ng/L- Negative Female for AMI  <22.0 ng/L- Negative Male for AMI  >=14 - Abnormal Female indicating possible myocardial injury.  >=22 - Abnormal Male indicating possible myocardial injury.   Clinicians would have to utilize clinical acumen, EKG, Troponin, and serial changes to determine if it is an Acute Myocardial Infarction or myocardial injury due to an underlying chronic condition.         CBC (No Diff) [[id number]]  (Abnormal) Collected: 04/16/25 0721    Specimen: Blood Updated: 04/16/25 0803     WBC 15.59 10*3/mm3      RBC 3.90 10*6/mm3      Hemoglobin 12.2 g/dL      Hematocrit 36.9 %      MCV 94.6 fL      MCH 31.3 pg      MCHC 33.1 g/dL      RDW 12.4 %      RDW-SD 43.1 fl      MPV 9.5 fL      Platelets 176 10*3/mm3     Comprehensive Metabolic Panel [[id number]]  (Abnormal) Collected: 04/16/25 0721    Specimen: Blood Updated: 04/16/25 0823     Glucose 134 mg/dL      BUN 19 mg/dL      Creatinine 1.31 mg/dL      Sodium 143 mmol/L      Potassium 3.2 mmol/L      Chloride 103 mmol/L      CO2 27.3 mmol/L      Calcium 8.8 mg/dL      Total Protein 5.9 g/dL      Albumin 3.7 g/dL      ALT (SGPT) 39 U/L      AST (SGOT) 67 U/L      Alkaline Phosphatase 150 U/L      Total Bilirubin 3.8 mg/dL      Globulin 2.2 gm/dL      A/G Ratio 1.7 g/dL      BUN/Creatinine Ratio 14.5     Anion Gap 12.7 mmol/L      eGFR 55.0 mL/min/1.73     Narrative:      GFR Categories in Chronic Kidney Disease (CKD)      GFR Category          GFR (mL/min/1.73)    Interpretation  G1                     90 or greater         Normal or high (1)  G2                      60-89                Mild decrease (1)  G3a                   45-59                Mild to moderate decrease  G3b                   30-44                Moderate to severe decrease  G4                    15-29                Severe decrease  G5                    14 or less           Kidney  failure          (1)In the absence of evidence of kidney disease, neither GFR category G1 or G2 fulfill the criteria for CKD.    eGFR calculation 2021 CKD-EPI creatinine equation, which does not include race as a factor            Results for orders placed or performed during the hospital encounter of 10/11/21   Blood Culture - Blood, Arm, Right    Specimen: Arm, Right; Blood   Result Value Ref Range    Blood Culture No growth at 5 days        Imaging Results (Last 24 Hours)       Procedure Component Value Units Date/Time    CT Angiogram Chest Pulmonary Embolism [[id number]] Collected: 04/16/25 0415     Updated: 04/16/25 0426    Narrative:      CT ANGIOGRAM OF THE CHEST     HISTORY: Respiratory distress     COMPARISON: December 11, 2024     TECHNIQUE: Axial CT imaging was obtained through the thorax. IV contrast  was administered. Three-D reformatted images were obtained.     FINDINGS:  No acute pulmonary thromboembolus is seen. Thoracic aorta is normal in  caliber. There is no evidence of dissection. There is some dilatation of  the main pulmonary artery, which can be seen in setting of pulmonary  arterial hypertension. There are coronary artery calcifications. The  heart is enlarged. The patient has a moderate to large pericardial  effusion. It appears similar in size to the prior study. There are  changes of prior right lower lobectomy. There is a separate origin of  the left vertebral artery from the aortic arch. Shotty mediastinal lymph  nodes appear stable when compared to prior study. There is interlobular  septal thickening, which may reflect vascular congestion. There is also  some mosaic attenuation, which was also present on prior exam, and may  reflect some air trapping. Chronic scarring is noted within the right  lung. Patient does appear to have some pleural calcifications. There is  chronic pleural fluid noted at the right lung base. The patient has a  new small left pleural effusion. Images through  the upper abdomen do not  demonstrate any acute abnormalities. The patient does have some plaque  at the origin of the celiac axis, with moderate to severe stenosis and  associated poststenotic dilatation. No acute osseous abnormalities are  seen.       Impression:         1. Cardiomegaly, interlobular septal thickening, and left pleural  effusion, suggesting congestive heart failure.  2. Stable moderate to large pericardial effusion.     Radiation dose reduction techniques were utilized, including automated  exposure control and exposure modulation based on body size.        This report was finalized on 4/16/2025 4:23 AM by Dr. Tatyana Santacruz M.D on Workstation: [id number]       XR Chest 1 View [[id number]] Collected: 04/16/25 0314     Updated: 04/16/25 0319    Narrative:      SINGLE VIEW OF THE CHEST     HISTORY: Respiratory distress     COMPARISON: 10/16/2024     FINDINGS:  There is volume loss within the right hemithorax, with mediastinal shift  to the right. Right basilar consolidation and right pleural fluid are  again noted. No pneumothorax is seen. Cardiac silhouette is stable.  There does appear to be some interstitial prominence. This could reflect  some vascular congestion. No definite acute infiltrates are seen on the  left.       Impression:      There is some interstitial prominence. There may be some vascular  congestion. Volume loss within the right hemithorax appears stable.     This report was finalized on 4/16/2025 3:16 AM by Dr. Tatyana Santacruz M.D on Workstation: [id number]               Results for orders placed during the hospital encounter of 07/24/23    Adult Transthoracic Echo Complete W/ Cont if Necessary Per Protocol    Interpretation Summary    Left ventricular systolic function is normal. Calculated left ventricular EF = 64.2% Normal left ventricular cavity size and wall thickness noted. All left ventricular wall segments contract normally. Left ventricular diastolic function  was indeterminate.    The left atrial cavity is moderately dilated. The right atrial cavity is moderately dilated.    There is moderate calcification of the aortic valve. The aortic valve was poorly visualized but appears trileaflet. Trace aortic valve regurgitation is present. No aortic valve stenosis is present.    Mild mitral valve regurgitation is present.    Mild tricuspid valve regurgitation is present. Estimated right ventricular systolic pressure from tricuspid regurgitation is mildly elevated (35-45 mmHg). Calculated right ventricular systolic pressure from tricuspid regurgitation is 40 mmHg.    There is a small (<1cm) circumferential pericardial effusion.      ECG 12 Lead Dyspnea   Preliminary Result   HEART ORSW=091  bpm   RR Vnzemtpd=889  ms   LA Interval=  ms   P Horizontal Axis=  deg   P Front Axis=  deg   QRSD Zbftuzwv=292  ms   QT Lbkdkgru=623  ms   DMtV=227  ms   QRS Axis=160  deg   T Wave Axis=25  deg   - ABNORMAL ECG -   Atrial fibrillation   RBBB and LPFB   Date and Time of Study:2025-04-16 02:39:47                 Assessment/Plan     Active Hospital Problems    Diagnosis  POA    **Respiratory failure [J96.90]  Yes    CKD (chronic kidney disease) stage 2, GFR 60-89 ml/min [N18.2]  Yes    Diastolic CHF, acute on chronic [I50.33]  Yes    Permanent atrial fibrillation [I48.21]  Yes    COPD with acute exacerbation [J44.1]  Yes    Adenocarcinoma of right lung [C34.91]  Yes    Prostate cancer [C61]  Yes    Hypertension [I10]  Yes      Resolved Hospital Problems   No resolved problems to display.     Acute on chronic diastolic  heart failure  Atrial fibrillation  Hypertension  He has an echocardiogram and June 2023 with an EF of 64% with indeterminate diastolic function.  A repeat echocardiogram has been ordered and has been started on IV diuretics  Patient is also on diltiazem and bisoprolol home.  His blood pressures on the softer side hold off on reinitiating these medications for now until seen by  cards  Continue Eliquis for anticoagulation  Cardiology consult pending   Lasix 40mg IV q12h for 2 doses     Intake/Output Summary (Last 24 hours) at 4/16/2025 1127  Last data filed at 4/16/2025 1043  Gross per 24 hour   Intake --   Output 500 ml   Net -500 ml         CKD 2  Scr around baseline    COPD   Not in acute exacerbation  Resume home nebulizer       I discussed the patient's findings and my recommendations with patient and nursing staff.    VTE Prophylaxis - Eliquis (home med).  Code Status - Full code.       Maicol Bond MD  [facility]  04/16/25  08:55 EDT     Patient has no objection to blood transfusions.

## 2025-05-28 ENCOUNTER — APPOINTMENT (OUTPATIENT)
Dept: ORTHOPEDIC SURGERY | Facility: CLINIC | Age: 70
End: 2025-05-28
Payer: COMMERCIAL

## 2025-05-28 DIAGNOSIS — M17.12 UNILATERAL PRIMARY OSTEOARTHRITIS, LEFT KNEE: ICD-10-CM

## 2025-05-28 PROCEDURE — 73562 X-RAY EXAM OF KNEE 3: CPT | Mod: LT

## 2025-05-28 PROCEDURE — 99203 OFFICE O/P NEW LOW 30 MIN: CPT

## 2025-06-18 ENCOUNTER — APPOINTMENT (OUTPATIENT)
Dept: CARDIOLOGY | Facility: CLINIC | Age: 70
End: 2025-06-18
Payer: COMMERCIAL

## 2025-06-18 VITALS
HEIGHT: 62 IN | HEART RATE: 85 BPM | DIASTOLIC BLOOD PRESSURE: 70 MMHG | WEIGHT: 172 LBS | SYSTOLIC BLOOD PRESSURE: 128 MMHG | OXYGEN SATURATION: 96 % | BODY MASS INDEX: 31.65 KG/M2

## 2025-06-18 PROBLEM — Z95.2 S/P AVR (AORTIC VALVE REPLACEMENT): Status: ACTIVE | Noted: 2025-06-18

## 2025-06-18 PROCEDURE — 99214 OFFICE O/P EST MOD 30 MIN: CPT

## 2025-06-18 PROCEDURE — 93000 ELECTROCARDIOGRAM COMPLETE: CPT

## 2025-06-18 PROCEDURE — G2211 COMPLEX E/M VISIT ADD ON: CPT | Mod: NC

## 2025-06-18 RX ORDER — ASPIRIN 81 MG/1
81 TABLET, COATED ORAL
Refills: 0 | Status: ACTIVE | COMMUNITY

## 2025-06-18 RX ORDER — ROSUVASTATIN CALCIUM 5 MG/1
TABLET, FILM COATED ORAL
Refills: 0 | Status: ACTIVE | COMMUNITY

## 2025-06-18 RX ORDER — PARICALCITOL 1 UG/1
1 CAPSULE ORAL
Refills: 0 | Status: ACTIVE | COMMUNITY

## 2025-06-18 RX ORDER — AMIODARONE HYDROCHLORIDE 200 MG/1
200 TABLET ORAL
Refills: 0 | Status: ACTIVE | COMMUNITY

## 2025-06-18 RX ORDER — CHROMIUM 200 MCG
TABLET ORAL
Refills: 0 | Status: ACTIVE | COMMUNITY

## 2025-07-11 ENCOUNTER — APPOINTMENT (OUTPATIENT)
Dept: ORTHOPEDIC SURGERY | Facility: CLINIC | Age: 70
End: 2025-07-11

## 2025-09-18 ENCOUNTER — APPOINTMENT (OUTPATIENT)
Dept: CARDIOLOGY | Facility: CLINIC | Age: 70
End: 2025-09-18
Payer: COMMERCIAL

## 2025-09-18 VITALS
BODY MASS INDEX: 32.27 KG/M2 | WEIGHT: 175.38 LBS | DIASTOLIC BLOOD PRESSURE: 90 MMHG | HEIGHT: 62 IN | OXYGEN SATURATION: 96 % | HEART RATE: 66 BPM | RESPIRATION RATE: 16 BRPM | SYSTOLIC BLOOD PRESSURE: 140 MMHG

## 2025-09-18 DIAGNOSIS — E78.5 HYPERLIPIDEMIA, UNSPECIFIED: ICD-10-CM

## 2025-09-18 DIAGNOSIS — Z01.818 ENCOUNTER FOR OTHER PREPROCEDURAL EXAMINATION: ICD-10-CM

## 2025-09-18 DIAGNOSIS — Z95.2 PRESENCE OF PROSTHETIC HEART VALVE: ICD-10-CM

## 2025-09-18 DIAGNOSIS — I10 ESSENTIAL (PRIMARY) HYPERTENSION: ICD-10-CM

## 2025-09-18 DIAGNOSIS — I35.0 NONRHEUMATIC AORTIC (VALVE) STENOSIS: ICD-10-CM

## 2025-09-18 PROCEDURE — G2211 COMPLEX E/M VISIT ADD ON: CPT | Mod: NC

## 2025-09-18 PROCEDURE — 99214 OFFICE O/P EST MOD 30 MIN: CPT

## 2025-09-18 RX ORDER — SODIUM BICARBONATE 650 MG/1
650 TABLET ORAL 3 TIMES DAILY
Refills: 0 | Status: ACTIVE | COMMUNITY
Start: 2025-09-18